# Patient Record
Sex: FEMALE | Race: WHITE | NOT HISPANIC OR LATINO | Employment: OTHER | URBAN - METROPOLITAN AREA
[De-identification: names, ages, dates, MRNs, and addresses within clinical notes are randomized per-mention and may not be internally consistent; named-entity substitution may affect disease eponyms.]

---

## 2017-01-23 ENCOUNTER — TELEPHONE (OUTPATIENT)
Dept: INTERNAL MEDICINE | Facility: CLINIC | Age: 72
End: 2017-01-23

## 2017-01-23 NOTE — TELEPHONE ENCOUNTER
----- Message from Annada Aquino sent at 1/23/2017  9:24 AM CST -----  Pt is requesting a call from nurse discuss r/s her appt to Wednesday or Friday.            Please call pt back at [phone]

## 2017-01-25 ENCOUNTER — CLINICAL SUPPORT (OUTPATIENT)
Dept: INTERNAL MEDICINE | Facility: CLINIC | Age: 72
End: 2017-01-25
Payer: COMMERCIAL

## 2017-01-25 PROCEDURE — 96372 THER/PROPH/DIAG INJ SC/IM: CPT | Mod: S$GLB,,, | Performed by: FAMILY MEDICINE

## 2017-01-25 PROCEDURE — 99999 PR PBB SHADOW E&M-EST. PATIENT-LVL I: CPT | Mod: PBBFAC,,,

## 2017-01-25 RX ADMIN — CYANOCOBALAMIN 1000 MCG: 1000 INJECTION, SOLUTION INTRAMUSCULAR; SUBCUTANEOUS at 10:01

## 2017-01-25 NOTE — PROGRESS NOTES
After obtaining consent, and per orders of Dr. Khadijah López, injection of 1000mcg of B12 given by Hari Cedeno LPN. Patient instructed to remain in clinic for 20 minutes afterwards, and to report any adverse reaction to me immediately.    Hari Cedeno LPN

## 2017-02-22 ENCOUNTER — LAB VISIT (OUTPATIENT)
Dept: LAB | Facility: HOSPITAL | Age: 72
End: 2017-02-22
Attending: FAMILY MEDICINE
Payer: COMMERCIAL

## 2017-02-22 ENCOUNTER — OFFICE VISIT (OUTPATIENT)
Dept: INTERNAL MEDICINE | Facility: CLINIC | Age: 72
End: 2017-02-22
Payer: COMMERCIAL

## 2017-02-22 VITALS
BODY MASS INDEX: 18.27 KG/M2 | HEART RATE: 60 BPM | SYSTOLIC BLOOD PRESSURE: 138 MMHG | HEIGHT: 67 IN | TEMPERATURE: 97 F | DIASTOLIC BLOOD PRESSURE: 82 MMHG | WEIGHT: 116.38 LBS

## 2017-02-22 DIAGNOSIS — F43.29 GRIEF REACTION WITH PROLONGED BEREAVEMENT: Primary | ICD-10-CM

## 2017-02-22 DIAGNOSIS — E55.9 VITAMIN D DEFICIENCY DISEASE: ICD-10-CM

## 2017-02-22 DIAGNOSIS — L40.9 PSORIASIS: ICD-10-CM

## 2017-02-22 DIAGNOSIS — E53.8 VITAMIN B12 DEFICIENCY DISEASE: ICD-10-CM

## 2017-02-22 DIAGNOSIS — F43.29 GRIEF REACTION WITH PROLONGED BEREAVEMENT: ICD-10-CM

## 2017-02-22 LAB
25(OH)D3+25(OH)D2 SERPL-MCNC: 43 NG/ML
BASOPHILS # BLD AUTO: 0.06 K/UL
BASOPHILS NFR BLD: 1 %
DIFFERENTIAL METHOD: ABNORMAL
EOSINOPHIL # BLD AUTO: 0.1 K/UL
EOSINOPHIL NFR BLD: 1 %
ERYTHROCYTE [DISTWIDTH] IN BLOOD BY AUTOMATED COUNT: 13.4 %
HCT VFR BLD AUTO: 37.3 %
HGB BLD-MCNC: 12.3 G/DL
LYMPHOCYTES # BLD AUTO: 1.3 K/UL
LYMPHOCYTES NFR BLD: 21.7 %
MCH RBC QN AUTO: 31.5 PG
MCHC RBC AUTO-ENTMCNC: 33 %
MCV RBC AUTO: 96 FL
MONOCYTES # BLD AUTO: 0.6 K/UL
MONOCYTES NFR BLD: 10.8 %
NEUTROPHILS # BLD AUTO: 3.9 K/UL
NEUTROPHILS NFR BLD: 65.5 %
PLATELET # BLD AUTO: 300 K/UL
PMV BLD AUTO: 11.2 FL
RBC # BLD AUTO: 3.9 M/UL
VIT B12 SERPL-MCNC: 621 PG/ML
WBC # BLD AUTO: 5.9 K/UL

## 2017-02-22 PROCEDURE — 1159F MED LIST DOCD IN RCRD: CPT | Mod: S$GLB,,, | Performed by: FAMILY MEDICINE

## 2017-02-22 PROCEDURE — 85025 COMPLETE CBC W/AUTO DIFF WBC: CPT

## 2017-02-22 PROCEDURE — 1126F AMNT PAIN NOTED NONE PRSNT: CPT | Mod: S$GLB,,, | Performed by: FAMILY MEDICINE

## 2017-02-22 PROCEDURE — 99999 PR PBB SHADOW E&M-EST. PATIENT-LVL III: CPT | Mod: PBBFAC,,, | Performed by: FAMILY MEDICINE

## 2017-02-22 PROCEDURE — 36415 COLL VENOUS BLD VENIPUNCTURE: CPT | Mod: PO

## 2017-02-22 PROCEDURE — 1157F ADVNC CARE PLAN IN RCRD: CPT | Mod: S$GLB,,, | Performed by: FAMILY MEDICINE

## 2017-02-22 PROCEDURE — 99214 OFFICE O/P EST MOD 30 MIN: CPT | Mod: 25,S$GLB,, | Performed by: FAMILY MEDICINE

## 2017-02-22 PROCEDURE — 96372 THER/PROPH/DIAG INJ SC/IM: CPT | Mod: S$GLB,,, | Performed by: FAMILY MEDICINE

## 2017-02-22 PROCEDURE — 82306 VITAMIN D 25 HYDROXY: CPT

## 2017-02-22 PROCEDURE — 1160F RVW MEDS BY RX/DR IN RCRD: CPT | Mod: S$GLB,,, | Performed by: FAMILY MEDICINE

## 2017-02-22 PROCEDURE — 82607 VITAMIN B-12: CPT

## 2017-02-22 RX ORDER — ACITRETIN 10 MG/1
10 CAPSULE ORAL
COMMUNITY
End: 2018-01-19

## 2017-02-22 RX ORDER — CYANOCOBALAMIN 1000 UG/ML
1000 INJECTION, SOLUTION INTRAMUSCULAR; SUBCUTANEOUS
Status: DISCONTINUED | OUTPATIENT
Start: 2017-02-22 | End: 2017-09-14

## 2017-02-22 RX ADMIN — CYANOCOBALAMIN 1000 MCG: 1000 INJECTION, SOLUTION INTRAMUSCULAR; SUBCUTANEOUS at 11:02

## 2017-02-22 NOTE — PROGRESS NOTES
Subjective:      Patient ID: Rosio Solis is a 71 y.o. female.    Chief Complaint: Follow-up (mood, weight, sleep)    HPI Comments: Patient is a 71-year-old female coming in today for follow-up of chronic conditions. .   She has been battling a prolonged grief reaction.  She did well with the Remeron however she stopped it in January.  She's now sleeping just fairly good.  She believes also that the B12 injections are helping some as well.  Her weight did increase about 2 pounds overall.  She's pleased with this at this time.  She now realizes that she must face each day forward and she is not the same person since her  has passed away but she has to move forward.  This is a new lace for the patient to be in understanding her grieving process.      She is currently taking vitamin D supplement at 2000 IUs daily.  She's willing to do lab work today.  She's doing monthly B12 injections at her office.  At this time she doesn't feel that she can get them to herself     t she is seen Dr. Marcos for her psoriasis and just recently started Soriatane.  He is monitoring her liver enzymes as well as her triglycerides.            Lab Results   Component Value Date    WBC 5.03 10/26/2016    HGB 12.4 10/26/2016    HCT 39.4 10/26/2016     10/26/2016    CHOL 258 (H) 10/26/2016    TRIG 78 10/26/2016    HDL >140 (H) 10/26/2016    ALT 12 10/26/2016    AST 18 10/26/2016     10/26/2016    K 4.7 10/26/2016    CL 99 10/26/2016    CREATININE 0.8 10/26/2016    BUN 13 10/26/2016    CO2 22 (L) 10/26/2016    TSH 1.087 10/26/2016    INR 1.0 06/19/2014       Review of Systems   Constitutional: Positive for activity change and appetite change. Negative for fatigue and unexpected weight change.   Respiratory: Negative for cough and shortness of breath.    Cardiovascular: Negative for chest pain and palpitations.   Gastrointestinal: Negative for abdominal distention and abdominal pain.   Endocrine:        Hair loss, wearing wig  today   Neurological: Negative for light-headedness and headaches.   Psychiatric/Behavioral: Negative for decreased concentration, dysphoric mood and sleep disturbance. The patient is not nervous/anxious.      Objective:     Physical Exam   Constitutional: She appears well-developed and well-nourished.   HENT:   Head: Normocephalic and atraumatic.   Right Ear: Tympanic membrane normal.   Left Ear: Tympanic membrane normal.   Mouth/Throat: Oropharynx is clear and moist.   Eyes: Conjunctivae and EOM are normal.   Neck: Normal range of motion. Neck supple.   Cardiovascular: Normal rate and regular rhythm.    Pulmonary/Chest: Effort normal and breath sounds normal.   Psychiatric: She has a normal mood and affect. Her behavior is normal. Judgment and thought content normal.   Nursing note and vitals reviewed.    Assessment:     1. Grief reaction with prolonged bereavement    2. Vitamin B12 deficiency disease    3. Vitamin D deficiency disease    4. Vitamin B12 deficiency disease      Plan:   Rosio was seen today for follow-up.    Diagnoses and all orders for this visit:    Grief reaction with prolonged bereavement  Comments:  off remeron, sleep improved even off meds. no need for counseling at this time. Ok to restart remeron in future if needed.   Orders:  -     Vitamin D; Future  -     Vitamin B12; Future  -     CBC auto differential; Future    Vitamin B12 deficiency disease  Comments:  getting monthly shot today of 1000mcg. doing well. improved with treatment. labs today.   Orders:  -     Vitamin D; Future  -     Vitamin B12; Future  -     CBC auto differential; Future  -     cyanocobalamin injection 1,000 mcg; Inject 1 mL (1,000 mcg total) into the skin every 30 days.    Vitamin D deficiency disease  Comments:  on 2000 IU daily otc. labs today  Orders:  -     Vitamin D; Future  -     Vitamin B12; Future  -     CBC auto differential; Future  Psoriasis- being treated currently by dermatology with Soriatane.  Patient's  going to have her liver enzymes and triglycerides monitored through Dr. Marcos's office.        Return in about 6 months (around 8/22/2017) for chronic issues, b12, vit D .

## 2017-02-24 ENCOUNTER — PATIENT MESSAGE (OUTPATIENT)
Dept: INTERNAL MEDICINE | Facility: CLINIC | Age: 72
End: 2017-02-24

## 2017-03-23 ENCOUNTER — CLINICAL SUPPORT (OUTPATIENT)
Dept: INTERNAL MEDICINE | Facility: CLINIC | Age: 72
End: 2017-03-23
Payer: COMMERCIAL

## 2017-03-23 PROCEDURE — 96372 THER/PROPH/DIAG INJ SC/IM: CPT | Mod: S$GLB,,, | Performed by: FAMILY MEDICINE

## 2017-03-23 RX ADMIN — CYANOCOBALAMIN 1000 MCG: 1000 INJECTION, SOLUTION INTRAMUSCULAR; SUBCUTANEOUS at 10:03

## 2017-04-10 ENCOUNTER — PATIENT MESSAGE (OUTPATIENT)
Dept: INTERNAL MEDICINE | Facility: CLINIC | Age: 72
End: 2017-04-10

## 2017-04-20 ENCOUNTER — CLINICAL SUPPORT (OUTPATIENT)
Dept: INTERNAL MEDICINE | Facility: CLINIC | Age: 72
End: 2017-04-20
Payer: COMMERCIAL

## 2017-04-20 PROCEDURE — 96372 THER/PROPH/DIAG INJ SC/IM: CPT | Mod: S$GLB,,, | Performed by: FAMILY MEDICINE

## 2017-04-20 RX ADMIN — CYANOCOBALAMIN 1000 MCG: 1000 INJECTION, SOLUTION INTRAMUSCULAR; SUBCUTANEOUS at 10:04

## 2017-05-18 ENCOUNTER — CLINICAL SUPPORT (OUTPATIENT)
Dept: INTERNAL MEDICINE | Facility: CLINIC | Age: 72
End: 2017-05-18
Payer: COMMERCIAL

## 2017-05-18 PROCEDURE — 96372 THER/PROPH/DIAG INJ SC/IM: CPT | Mod: S$GLB,,, | Performed by: FAMILY MEDICINE

## 2017-05-18 PROCEDURE — 99999 PR PBB SHADOW E&M-EST. PATIENT-LVL I: CPT | Mod: PBBFAC,,,

## 2017-05-18 RX ADMIN — CYANOCOBALAMIN 1000 MCG: 1000 INJECTION, SOLUTION INTRAMUSCULAR; SUBCUTANEOUS at 10:05

## 2017-06-07 ENCOUNTER — PATIENT MESSAGE (OUTPATIENT)
Dept: INTERNAL MEDICINE | Facility: CLINIC | Age: 72
End: 2017-06-07

## 2017-06-12 ENCOUNTER — PATIENT MESSAGE (OUTPATIENT)
Dept: INTERNAL MEDICINE | Facility: CLINIC | Age: 72
End: 2017-06-12

## 2017-06-15 ENCOUNTER — OFFICE VISIT (OUTPATIENT)
Dept: INTERNAL MEDICINE | Facility: CLINIC | Age: 72
End: 2017-06-15
Payer: COMMERCIAL

## 2017-06-15 VITALS
BODY MASS INDEX: 18.24 KG/M2 | SYSTOLIC BLOOD PRESSURE: 140 MMHG | DIASTOLIC BLOOD PRESSURE: 70 MMHG | HEIGHT: 67 IN | HEART RATE: 78 BPM | WEIGHT: 116.19 LBS | TEMPERATURE: 97 F

## 2017-06-15 DIAGNOSIS — E53.8 VITAMIN B12 DEFICIENCY DISEASE: ICD-10-CM

## 2017-06-15 DIAGNOSIS — H69.90 EUSTACHIAN TUBE DYSFUNCTION, UNSPECIFIED LATERALITY: ICD-10-CM

## 2017-06-15 DIAGNOSIS — F43.29 GRIEF REACTION WITH PROLONGED BEREAVEMENT: Primary | ICD-10-CM

## 2017-06-15 DIAGNOSIS — J30.89 ALLERGIC RHINITIS DUE TO OTHER ALLERGIC TRIGGER, UNSPECIFIED RHINITIS SEASONALITY: ICD-10-CM

## 2017-06-15 PROCEDURE — 1159F MED LIST DOCD IN RCRD: CPT | Mod: S$GLB,,, | Performed by: FAMILY MEDICINE

## 2017-06-15 PROCEDURE — 99214 OFFICE O/P EST MOD 30 MIN: CPT | Mod: 25,S$GLB,, | Performed by: FAMILY MEDICINE

## 2017-06-15 PROCEDURE — 96372 THER/PROPH/DIAG INJ SC/IM: CPT | Mod: S$GLB,,, | Performed by: FAMILY MEDICINE

## 2017-06-15 PROCEDURE — 1126F AMNT PAIN NOTED NONE PRSNT: CPT | Mod: S$GLB,,, | Performed by: FAMILY MEDICINE

## 2017-06-15 PROCEDURE — 99999 PR PBB SHADOW E&M-EST. PATIENT-LVL III: CPT | Mod: PBBFAC,,, | Performed by: FAMILY MEDICINE

## 2017-06-15 RX ORDER — FLUTICASONE PROPIONATE 50 MCG
2 SPRAY, SUSPENSION (ML) NASAL DAILY
Qty: 16 G | Refills: 0 | Status: SHIPPED | OUTPATIENT
Start: 2017-06-15 | End: 2017-07-15

## 2017-06-15 RX ADMIN — CYANOCOBALAMIN 1000 MCG: 1000 INJECTION, SOLUTION INTRAMUSCULAR; SUBCUTANEOUS at 01:06

## 2017-06-15 NOTE — PROGRESS NOTES
"Subjective:      Patient ID: Rosio Solis is a 71 y.o. female.    Chief Complaint: ear issues; sinus issues; and grief    Patient is a 71-year-old female coming in today for follow-up of chronic conditions. .   She has been battling a prolonged grief reaction.  She did well with the Remeron however she stopped it in January.  [facility] however she stopped because she felt like she just needs someone that was a little bit older.  She got some good benefit out of it but she still feels like she needs to go back into counseling.  She is currently sleeping well.  She's not on any medications at this time for the grief reaction however once again she is back to where she feels like she did almost cry again.  She feels that she's having a "non-emotional status" and this is bothering her.    She also reports that lately since the end of April she felt like she was having a lot of sinus issues.  She met with her pharmacist and discussed with some he had her to use some Sudafed and she started nasal spray.  She did it for about 2-3 weeks but still ever since she feels like occasionally her ears will get stopped up at times and she'll have a postnasal drip.  She is not currently on any Sudafed at this time.  She's not had any hearing loss.  She normally doesn't have any issues with ALLERGIES.    She's currently seeing Dr. Bertrand for her psoriasis and he has her on a Retin-A type product.  She'sat she's getting a lot of red spots recurrently.  She is uncertain if this was a common side effect from it.    She's also here today to have another B12 injection.  She was noted to be low on her B12.        Lab Results   Component Value Date    WBC 5.90 02/22/2017    HGB 12.3 02/22/2017    HCT 37.3 02/22/2017     02/22/2017    CHOL 258 (H) 10/26/2016    TRIG 78 10/26/2016    HDL >140 (H) 10/26/2016    ALT 12 10/26/2016    AST 18 10/26/2016     10/26/2016    K 4.7 " 10/26/2016    CL 99 10/26/2016    CREATININE 0.8 10/26/2016    BUN 13 10/26/2016    CO2 22 (L) 10/26/2016    TSH 1.087 10/26/2016    INR 1.0 06/19/2014       Review of Systems   Constitutional: Negative for activity change, appetite change, fatigue and unexpected weight change.   HENT: Positive for congestion and postnasal drip. Negative for ear discharge, ear pain, rhinorrhea, sinus pressure, sneezing, sore throat, tinnitus, trouble swallowing and voice change.    Respiratory: Negative for cough and shortness of breath.    Cardiovascular: Negative for chest pain and palpitations.   Hematological: Negative for adenopathy. Bruises/bleeds easily.   Psychiatric/Behavioral: Positive for dysphoric mood. Negative for decreased concentration and sleep disturbance. The patient is not nervous/anxious.      Objective:     Physical Exam   Constitutional: She appears well-developed and well-nourished.   HENT:   Head: Normocephalic and atraumatic.   Right Ear: Tympanic membrane and external ear normal. No decreased hearing is noted.   Left Ear: Tympanic membrane and external ear normal. No decreased hearing is noted.   Nose: Mucosal edema and rhinorrhea present.   Mouth/Throat: Uvula is midline and oropharynx is clear and moist.   Eyes: Conjunctivae and EOM are normal.   Neck: Normal range of motion. Neck supple.   Cardiovascular: Normal rate and regular rhythm.    Pulmonary/Chest: Effort normal and breath sounds normal.   Psychiatric: Her speech is normal and behavior is normal. Thought content normal. She exhibits a depressed mood.   Nursing note and vitals reviewed.    Assessment:     1. Grief reaction with prolonged bereavement    2. Eustachian tube dysfunction, unspecified laterality    3. Allergic rhinitis due to other allergic trigger, unspecified rhinitis seasonality    4. Vitamin B12 deficiency disease      Plan:   Rosio was seen today for ear issues, sinus issues and grief.    Diagnoses and all orders for this  visit:    Grief reaction with prolonged bereavement- refer to yane scott vs Dr oGdfrey Ag for additional counseling.   -     Ambulatory referral to Psychology  -     CBC auto differential; Future  -     Vitamin B12; Future  -     Comprehensive metabolic panel; Future    Eustachian tube dysfunction, unspecified laterality- new. Trial of nasal steroids. Add sudafed. If not improving, can do trial of singulair.     Allergic rhinitis due to other allergic trigger, unspecified rhinitis seasonality  -     CBC auto differential; Future  -     Vitamin B12; Future  -     Comprehensive metabolic panel; Future    Vitamin B12 deficiency disease - monthly injections, labs prior to next visit  -     CBC auto differential; Future  -     Vitamin B12; Future  -     Comprehensive metabolic panel; Future    Other orders  -     fluticasone (FLONASE) 50 mcg/actuation nasal spray; 2 sprays by Each Nare route once daily. Would recommend sensimyst or version like veramyst            Return in about 3 months (around 9/15/2017) for b12, counselors.

## 2017-06-20 ENCOUNTER — PATIENT MESSAGE (OUTPATIENT)
Dept: INTERNAL MEDICINE | Facility: CLINIC | Age: 72
End: 2017-06-20

## 2017-06-29 ENCOUNTER — OFFICE VISIT (OUTPATIENT)
Dept: PSYCHIATRY | Facility: CLINIC | Age: 72
End: 2017-06-29
Payer: COMMERCIAL

## 2017-06-29 DIAGNOSIS — F43.29 GRIEF REACTION WITH PROLONGED BEREAVEMENT: ICD-10-CM

## 2017-06-29 DIAGNOSIS — F43.21 ADJUSTMENT REACTION WITH PROLONGED DEPRESSIVE REACTION: Primary | ICD-10-CM

## 2017-06-29 PROCEDURE — 90791 PSYCH DIAGNOSTIC EVALUATION: CPT | Mod: S$GLB,,, | Performed by: SOCIAL WORKER

## 2017-07-07 NOTE — PROGRESS NOTES
"Psychiatry Initial Visit (PhD/LCSW)  Diagnostic Interview - CPT 02274    Date: 2017    Site: [facility]    Referral source:  Primary care physician Khadijah López M.D. Of Family Medicine    Clinical status of patient: Outpatient    Rosio Solis, a 71 y.o. female, for initial evaluation visit.  Met with patient.    Chief complaint/reason for encounter: depression and appetite    History of present illness:  71 year old  female patient presented as referral from Family Medicine for report of depressive symptoms, with prolonged grief period.  Patient presented alert and oriented, neatly groomed, good eye contact and clear, linear verbal thought expression.  She reported her , Naun, with an "N",  two years earlier of cancer.  Patient has since tried to move on emotionally but said she finds herself returning to depressive symptoms, which include loss of appetite, ruminating thoughts, loss of valarie, and loss of desire to socialize.  She stated her 6 granchildren help keep her involved and that she does make an effort but feels she has to force herself to do things.  She reported no suicidal ideation, no notable sleep disturbance, and no mood altering substance use problems.  She is well developed with no notable health concerns, though she said she often feels emotionally low-energy.  Her only reported medical issue, some psoriasis.  She had lost about 13 pounds in the final months of her 's illness, as she was focused on him and lost her appetite, and she said she has not gained back much in the past 2 years.  She appears thin but no gaunt. At her most recent physical exam, about a month ago, she weighed in at 116 lbs at 5'7".   Patient reported her only prior mental health history was of brief psychotherapy with a Dr. Burrows, PhD, about 4 sessions, following her 's death, but she discontinued, finding it a little  Hard to relate to him, due to the age difference.  She reported " "she did derive some benefit.  In talking with Dr. López, she decided to try counseling again, feeling a need to process her feelings and loss further.  Patient talked extensively of her marriage of 34 years, it being the second marriage for each of them.  She said she felt a connection with her  that she doubts many other couples experience, and she continues to feel a tremendous void from his passing.  She said she does maintain regular contact with her 2 children and cares about being involved with the 6 grandchildren, 3 from each of her children.  Identified goals of therapy are for patient to further process her grief, relieve some stress and enhance perspective; to get back in touch with feelings of gratitude for what she does have in her life.  Plan is to follow up in clinic 7/13/17.    Pain: noncontributory    Symptoms:   · Mood: depressed mood, diminished interest, weight loss and social isolation  · Anxiety: denied  · Substance abuse: denied  · Cognitive functioning: denied  · Health behaviors: noncontributory    Psychiatric history: psychotropic management by PCP and has participated in counseling/psychotherapy on an outpatient basis in the past    Medical history: noncontributory    Family history of psychiatric illness: none    Social history (marriage, employment, etc.):  Born in [place], the oldest of 2, with a brother 4 years younger.  Raised by both biological parents.  Brother remains in the [place] area.   twice.  first husbnad after 12 years; "just grew apart." 2 children by her first marriage, a boy and a girl.  Both are well into adulthood, with teens or pre-teens of their own.  Grandmother of 6, from her daughter ages 18, 16, and 15, and by her son ages 13, 9, and 7.   to second  for 34 years; they met at a teaching convention.  Both career educators.  Patient relocated to [place], [place] with .  Patient is retired; has regular frequent contact with " "children and grandchildren.  Stays involved, "even when [she] doesn't feel like it."     Substance use:   Alcohol: social   Drugs: none   Tobacco: none   Caffeine: clinically insignificant    Current medications and drug reactions (include OTC, herbal): see medication list      Strengths and liabilities: Strength: Patient accepts guidance/feedback, Strength: Patient is expressive/articulate., Strength: Patient is intelligent., Strength: Patient is physically healthy., Strength: Patient has positive support network.    Current Evaluation:     Mental Status Exam:  General Appearance:  unremarkable, age appropriate, normal weight, neatly groomed   Speech: normal tone, normal rate, normal pitch, normal volume      Level of Cooperation: cooperative      Thought Processes: normal and logical   Mood: sad      Thought Content: normal, no suicidality, no homicidality, delusions, or paranoia   Affect: congruent and appropriate   Orientation: Oriented x3   Memory: recent and remote memory intact   Attention Span & Concentration: intact   Fund of General Knowledge: intact and appropriate to age and level of education   Abstract Reasoning: not formally assessed   Judgment & Insight: fair     Language  intact     Diagnostic Impression - Plan:       ICD-10-CM ICD-9-CM   1. Adjustment reaction with prolonged depressive reaction F43.21 309.1   2. Grief reaction with prolonged bereavement F43.21 309.0       Plan:individual psychotherapy and medication management by physician    Return to Clinic: as scheduled    Length of Service (minutes): 45  "

## 2017-07-13 ENCOUNTER — OFFICE VISIT (OUTPATIENT)
Dept: PSYCHIATRY | Facility: CLINIC | Age: 72
End: 2017-07-13
Payer: COMMERCIAL

## 2017-07-13 ENCOUNTER — CLINICAL SUPPORT (OUTPATIENT)
Dept: INTERNAL MEDICINE | Facility: CLINIC | Age: 72
End: 2017-07-13
Payer: COMMERCIAL

## 2017-07-13 DIAGNOSIS — F43.29 GRIEF REACTION WITH PROLONGED BEREAVEMENT: Primary | ICD-10-CM

## 2017-07-13 PROCEDURE — 90834 PSYTX W PT 45 MINUTES: CPT | Mod: S$GLB,,, | Performed by: SOCIAL WORKER

## 2017-07-17 NOTE — PROGRESS NOTES
"Individual Psychotherapy (PhD/LCSW)    7/13/2017    Site:  [facility]         Therapeutic Intervention: Met with patient.  Outpatient - Insight oriented psychotherapy 45 min - CPT code 67119 and Outpatient - Supportive psychotherapy 45 min - CPT Code 73097    Chief complaint/reason for encounter: depression and grief     Interval history and content of current session:  71 year old female patient returned for follow up psychotherapy to address unresolved grief/prolonged bereavement at loss of her , Naun, 2 years ago from cancer.  Patient presented alert and oriented, neatly groomed, smiling pleasantly.  She brought a nice framed photo of her late  circa 2010, before his cancer diagnosis.  Patient reported doing fair in the interim.  She reported her assessment from previously that she feels "un-anchored," unsure of what she even wants or can look forward to.  She endorsed a thought pattern for much of her life of "not belonging," of not being good enough.  She recounted that meeting her [id number] was when she managed to escape that feeling.  She felt very well-matched to him.  Now she is back to feeling at a loss.  But she also endorsed that she has friends and family that care for her, that she remains involved socially, even when she does not feel inclined to be social, and that she has good friends she does talk to.  Patient talked for a while of some of her 's qualities she appreciated most.  Supportive therapy provided.  Patient endorsed feeling lack of purpose but denied any si/hi, psychosis, mood swings, or substance abuse.  Plan is to follow up in clinic 8/8/17.     Treatment plan:  · Target symptoms: depression, adjustment, grief  · Why chosen therapy is appropriate versus another modality: relevant to diagnosis, patient responds to this modality  · Outcome monitoring methods: self-report, observation  · Therapeutic intervention type: insight oriented psychotherapy, supportive " psychotherapy    Risk parameters:  Patient reports no suicidal ideation  Patient reports no homicidal ideation  Patient reports no self-injurious behavior  Patient reports no violent behavior    Verbal deficits: None    Patient's response to intervention:  The patient's response to intervention is accepting.    Progress toward goals and other mental status changes:  The patient's progress toward goals is fair .    Diagnosis:     ICD-10-CM ICD-9-CM   1. Grief reaction with prolonged bereavement F43.21 309.0       Plan:  individual psychotherapy    Return to clinic: as scheduled    Length of Service (minutes): 45

## 2017-08-09 ENCOUNTER — OFFICE VISIT (OUTPATIENT)
Dept: PSYCHIATRY | Facility: CLINIC | Age: 72
End: 2017-08-09
Payer: COMMERCIAL

## 2017-08-09 DIAGNOSIS — F43.29 GRIEF REACTION WITH PROLONGED BEREAVEMENT: Primary | ICD-10-CM

## 2017-08-09 PROCEDURE — 90834 PSYTX W PT 45 MINUTES: CPT | Mod: S$GLB,,, | Performed by: SOCIAL WORKER

## 2017-08-10 ENCOUNTER — CLINICAL SUPPORT (OUTPATIENT)
Dept: INTERNAL MEDICINE | Facility: CLINIC | Age: 72
End: 2017-08-10
Payer: COMMERCIAL

## 2017-08-10 PROCEDURE — 96372 THER/PROPH/DIAG INJ SC/IM: CPT | Mod: S$GLB,,, | Performed by: FAMILY MEDICINE

## 2017-08-10 PROCEDURE — 99999 PR PBB SHADOW E&M-EST. PATIENT-LVL I: CPT | Mod: PBBFAC,,,

## 2017-08-10 RX ADMIN — CYANOCOBALAMIN 1000 MCG: 1000 INJECTION, SOLUTION INTRAMUSCULAR; SUBCUTANEOUS at 09:08

## 2017-08-11 NOTE — PROGRESS NOTES
Individual Psychotherapy (PhD/LCSW)    8/9/2017    Site:  [facility]         Therapeutic Intervention: Met with patient.  Outpatient - Insight oriented psychotherapy 45 min - CPT code 99681 and Outpatient - Supportive psychotherapy 45 min - CPT Code 22030    Chief complaint/reason for encounter: depression and grief     Interval history and content of current session:  71 year old female patient returned for follow up psychotherapy to address unresolved grief/prolonged bereavement at loss of her , Naun, 2 years ago from cancer.  Patient presented alert and oriented, neatly groomed, pleasant.  Patient reported she remains preoccupied much of the time with thoughts of missing her late  these past 2 years.  She reported continuing to go through the motions of socializing, stays involved with step-grandchildren and great-nieces/nephews; plays cards with old friends, etc.  She reported she also stays aware of a social calendar and has trips planned.  Endorsed often feeling like she does not want to do any of it but she makes herself.  Noted October 9th is her 3 year anniversary of her 's death.  She mentioned a trip planned to [place], [place] to see old friends [place] Wednesday for 7 days; stated mixed feelings; lacking drive but said she will make herself go; hates to disappoint people expecting her.  Reported difficulty getting out of bed some times, not physically but emotionally.  Talked about recurrent migraine; said she sometimes goes months between headaches but this past week she has migraines on two back to back days.  Mentioned she still has concert tickets to see Herbert Walsh in October; said she is actually looking forward to that; has a friend she plans to ask to accompany her.  Denied any si/hi, psychosis, cognitive deficit, moo swings or substance abuse.  Expressed her spiritual foundational beliefs as concrete barrier to her ever considering such a thing as suicide; stated it does  not even cross her mind.  Supportive therapy provided.  Plan is to follow up in clinic sometime after her [place] trip; said she needs to check her calendar but anticipated for early to mid-September.    Treatment plan:  · Target symptoms: depression, adjustment, grief  · Why chosen therapy is appropriate versus another modality: relevant to diagnosis, patient responds to this modality  · Outcome monitoring methods: self-report, observation  · Therapeutic intervention type: insight oriented psychotherapy, supportive psychotherapy    Risk parameters:  Patient reports no suicidal ideation  Patient reports no homicidal ideation  Patient reports no self-injurious behavior  Patient reports no violent behavior    Verbal deficits: None    Patient's response to intervention:  The patient's response to intervention is accepting.    Progress toward goals and other mental status changes:  The patient's progress toward goals is fair .    Diagnosis:     ICD-10-CM ICD-9-CM   1. Grief reaction with prolonged bereavement F43.21 309.0       Plan:  individual psychotherapy    Return to clinic: as scheduled    Length of Service (minutes): 45

## 2017-09-12 ENCOUNTER — LAB VISIT (OUTPATIENT)
Dept: LAB | Facility: HOSPITAL | Age: 72
End: 2017-09-12
Attending: FAMILY MEDICINE
Payer: COMMERCIAL

## 2017-09-12 DIAGNOSIS — J30.9 ALLERGIC RHINITIS DUE TO ALLERGEN: ICD-10-CM

## 2017-09-12 DIAGNOSIS — E53.8 VITAMIN B12 DEFICIENCY DISEASE: ICD-10-CM

## 2017-09-12 DIAGNOSIS — F43.29 GRIEF REACTION WITH PROLONGED BEREAVEMENT: ICD-10-CM

## 2017-09-12 LAB
ALBUMIN SERPL BCP-MCNC: 3.9 G/DL
ALP SERPL-CCNC: 79 U/L
ALT SERPL W/O P-5'-P-CCNC: 14 U/L
ANION GAP SERPL CALC-SCNC: 10 MMOL/L
AST SERPL-CCNC: 21 U/L
BASOPHILS # BLD AUTO: 0.03 K/UL
BASOPHILS NFR BLD: 0.6 %
BILIRUB SERPL-MCNC: 0.4 MG/DL
BUN SERPL-MCNC: 13 MG/DL
CALCIUM SERPL-MCNC: 9.4 MG/DL
CHLORIDE SERPL-SCNC: 100 MMOL/L
CO2 SERPL-SCNC: 29 MMOL/L
CREAT SERPL-MCNC: 0.7 MG/DL
DIFFERENTIAL METHOD: ABNORMAL
EOSINOPHIL # BLD AUTO: 0.1 K/UL
EOSINOPHIL NFR BLD: 1.2 %
ERYTHROCYTE [DISTWIDTH] IN BLOOD BY AUTOMATED COUNT: 14.1 %
EST. GFR  (AFRICAN AMERICAN): >60 ML/MIN/1.73 M^2
EST. GFR  (NON AFRICAN AMERICAN): >60 ML/MIN/1.73 M^2
GLUCOSE SERPL-MCNC: 80 MG/DL
HCT VFR BLD AUTO: 36.3 %
HGB BLD-MCNC: 11.8 G/DL
LYMPHOCYTES # BLD AUTO: 1.2 K/UL
LYMPHOCYTES NFR BLD: 23.5 %
MCH RBC QN AUTO: 30.4 PG
MCHC RBC AUTO-ENTMCNC: 32.5 G/DL
MCV RBC AUTO: 94 FL
MONOCYTES # BLD AUTO: 0.4 K/UL
MONOCYTES NFR BLD: 8.3 %
NEUTROPHILS # BLD AUTO: 3.4 K/UL
NEUTROPHILS NFR BLD: 66.2 %
PLATELET # BLD AUTO: 300 K/UL
PMV BLD AUTO: 11.5 FL
POTASSIUM SERPL-SCNC: 4.4 MMOL/L
PROT SERPL-MCNC: 7.5 G/DL
RBC # BLD AUTO: 3.88 M/UL
SODIUM SERPL-SCNC: 139 MMOL/L
WBC # BLD AUTO: 5.15 K/UL

## 2017-09-12 PROCEDURE — 85025 COMPLETE CBC W/AUTO DIFF WBC: CPT

## 2017-09-12 PROCEDURE — 82607 VITAMIN B-12: CPT

## 2017-09-12 PROCEDURE — 80053 COMPREHEN METABOLIC PANEL: CPT

## 2017-09-12 PROCEDURE — 36415 COLL VENOUS BLD VENIPUNCTURE: CPT | Mod: PO

## 2017-09-13 LAB — VIT B12 SERPL-MCNC: 679 PG/ML

## 2017-09-14 ENCOUNTER — OFFICE VISIT (OUTPATIENT)
Dept: INTERNAL MEDICINE | Facility: CLINIC | Age: 72
End: 2017-09-14
Payer: COMMERCIAL

## 2017-09-14 ENCOUNTER — DOCUMENTATION ONLY (OUTPATIENT)
Dept: INTERNAL MEDICINE | Facility: CLINIC | Age: 72
End: 2017-09-14

## 2017-09-14 VITALS
DIASTOLIC BLOOD PRESSURE: 70 MMHG | BODY MASS INDEX: 18.03 KG/M2 | SYSTOLIC BLOOD PRESSURE: 132 MMHG | HEIGHT: 67 IN | HEART RATE: 80 BPM | TEMPERATURE: 98 F | WEIGHT: 114.88 LBS

## 2017-09-14 DIAGNOSIS — D51.9 ANEMIA DUE TO VITAMIN B12 DEFICIENCY, UNSPECIFIED B12 DEFICIENCY TYPE: ICD-10-CM

## 2017-09-14 DIAGNOSIS — E53.8 VITAMIN B12 DEFICIENCY DISEASE: Primary | ICD-10-CM

## 2017-09-14 DIAGNOSIS — F43.29 GRIEF REACTION WITH PROLONGED BEREAVEMENT: ICD-10-CM

## 2017-09-14 DIAGNOSIS — H61.21 IMPACTED CERUMEN OF RIGHT EAR: ICD-10-CM

## 2017-09-14 DIAGNOSIS — Z12.11 COLON CANCER SCREENING: ICD-10-CM

## 2017-09-14 DIAGNOSIS — J30.89 CHRONIC NONSEASONAL ALLERGIC RHINITIS DUE TO OTHER ALLERGEN: ICD-10-CM

## 2017-09-14 DIAGNOSIS — L40.9 PSORIASIS: ICD-10-CM

## 2017-09-14 PROCEDURE — 99214 OFFICE O/P EST MOD 30 MIN: CPT | Mod: 25,S$GLB,, | Performed by: FAMILY MEDICINE

## 2017-09-14 PROCEDURE — 3008F BODY MASS INDEX DOCD: CPT | Mod: S$GLB,,, | Performed by: FAMILY MEDICINE

## 2017-09-14 PROCEDURE — 1159F MED LIST DOCD IN RCRD: CPT | Mod: S$GLB,,, | Performed by: FAMILY MEDICINE

## 2017-09-14 PROCEDURE — 99999 PR PBB SHADOW E&M-EST. PATIENT-LVL III: CPT | Mod: PBBFAC,,, | Performed by: FAMILY MEDICINE

## 2017-09-14 PROCEDURE — 99397 PER PM REEVAL EST PAT 65+ YR: CPT | Mod: S$GLB,,, | Performed by: FAMILY MEDICINE

## 2017-09-14 PROCEDURE — 1126F AMNT PAIN NOTED NONE PRSNT: CPT | Mod: S$GLB,,, | Performed by: FAMILY MEDICINE

## 2017-09-14 RX ORDER — CYANOCOBALAMIN 1000 UG/ML
1000 INJECTION, SOLUTION INTRAMUSCULAR; SUBCUTANEOUS
Status: DISCONTINUED | OUTPATIENT
Start: 2017-09-26 | End: 2017-10-06

## 2017-09-14 RX ORDER — FLUTICASONE PROPIONATE 50 MCG
2 SPRAY, SUSPENSION (ML) NASAL DAILY
Qty: 16 G | Refills: 12
Start: 2017-09-14 | End: 2017-10-14

## 2017-09-14 NOTE — PROGRESS NOTES
Subjective:      Patient ID: Rosio Solis is a 72 y.o. female.    Chief Complaint: Follow-up (labs, meds, anemia, mood)    Patient is a 72-year-old female coming in today for follow-up of chronic conditions. .   She has been battling a prolonged grief reaction.  She did well with the Remeron however she stopped it in January.  She attended counseling at the [facility] with Dr. Gary Hale however she stopped because she felt like she just needs someone that was a little bit older.  She did see counselor Jonah Stone 3 times.  She really doesn't feel like she needs it again.  She has it if she needs it.  She is currently sleeping well.  She's not on any medications at this time for the grief reaction.  In October and will be her 3 year anniversary of her 's passing.    She also reports that lately since the end of April she felt like she was having a lot of sinus issues.  She met with her pharmacist and discussed with some he had her to use some Sudafed and she started nasal spray.  She did it for about 2-3 weeks but still ever since she feels like occasionally her ears will get stopped up at times and she'll have a postnasal drip.  She is not currently on any Sudafed at this time.  She's not had any hearing loss.  She normally doesn't have any issues with ALLERGIES.  Does have some irritation of the nasal mucosa today.  Feels like her ears are recurrently stopped up.  Right ear is noted to have some ear wax present but she doesn't want to do anything about it today because she's traveling out of town [facility].    She's currently seeing Dr. Marcos for her psoriasis and he has her on a Retin-A type product.  She'sat she's getting a lot of red spots recurrently.  She is uncertain if this was a common side effect from it.  She is also noted to be anemic for the first time.  She has a known history of B12 deficiency.  She's been doing well with a monthly injection.  She is seeing some regrowth of her  hair.    From a vitamin D standpoint she is currently on 2000 IUs daily.  Her levels appear to be good.            Lab Results   Component Value Date    WBC 5.15 09/12/2017    HGB 11.8 (L) 09/12/2017    HCT 36.3 (L) 09/12/2017     09/12/2017    CHOL 258 (H) 10/26/2016    TRIG 78 10/26/2016    HDL >140 (H) 10/26/2016    ALT 14 09/12/2017    AST 21 09/12/2017     09/12/2017    K 4.4 09/12/2017     09/12/2017    CREATININE 0.7 09/12/2017    BUN 13 09/12/2017    CO2 29 09/12/2017    TSH 1.087 10/26/2016    INR 1.0 06/19/2014       Review of Systems   Constitutional: Negative for activity change, appetite change, fatigue and unexpected weight change.   HENT: Positive for congestion, ear discharge, postnasal drip and sinus pressure. Negative for ear pain, hearing loss, rhinorrhea and trouble swallowing.    Eyes: Negative for discharge and visual disturbance.   Respiratory: Negative for chest tightness and wheezing.    Cardiovascular: Negative for chest pain and palpitations.   Gastrointestinal: Negative for blood in stool, constipation, diarrhea and vomiting.   Endocrine: Negative for polydipsia and polyuria.   Genitourinary: Negative for difficulty urinating, dysuria, hematuria and menstrual problem.   Musculoskeletal: Negative for arthralgias, joint swelling and neck pain.   Skin: Positive for color change.   Neurological: Negative for weakness, light-headedness and headaches.   Hematological: Bruises/bleeds easily.   Psychiatric/Behavioral: Negative for confusion, dysphoric mood and sleep disturbance. The patient is not nervous/anxious.      Objective:     Physical Exam   Constitutional: She appears well-developed and well-nourished.   HENT:   Head: Normocephalic and atraumatic.   Right Ear: Tympanic membrane and external ear normal. No decreased hearing is noted.   Left Ear: Tympanic membrane and external ear normal. No decreased hearing is noted.   Nose: Mucosal edema and rhinorrhea present.    Mouth/Throat: Uvula is midline and oropharynx is clear and moist.   Right ear with some cerumen present but not fully impacted   Eyes: Conjunctivae and EOM are normal.   Neck: Normal range of motion. Neck supple.   Cardiovascular: Normal rate and regular rhythm.    Pulmonary/Chest: Effort normal and breath sounds normal.   Skin: Bruising, ecchymosis and lesion noted. No rash noted. There is erythema.        Psychiatric: She has a normal mood and affect. Her speech is normal and behavior is normal. Judgment and thought content normal. Cognition and memory are normal.   Nursing note reviewed.    Assessment:     1. Vitamin B12 deficiency disease    2. Anemia due to vitamin B12 deficiency, unspecified B12 deficiency type    3. Psoriasis    4. Grief reaction with prolonged bereavement    5. Colon cancer screening    6. Chronic nonseasonal allergic rhinitis due to other allergen    7. Impacted cerumen of right ear      Plan:   Rosio was seen today for follow-up of multiple issues    Diagnoses and all orders for this visit:    Vitamin B12 deficiency disease-continue with monthly dosing improving  -     CBC auto differential; Future  -     Vitamin B12; Future  -     Ferritin; Future  -     Iron and TIBC; Future  -     Comprehensive metabolic panel; Future  -     cyanocobalamin injection 1,000 mcg; Inject 1 mL (1,000 mcg total) into the skin every 30 days.    Anemia due to vitamin B12 deficiency, unspecified B12 deficiency type-new for the patient will advise her to my touch base with her dermatologist regarding this.  Would also recommend that she do the fecal occult blood testing today to rule out any blood loss from the gut.  She declines doing a colonoscopy.  -     Fecal Immunochemical Test (iFOBT); Future  -     CBC auto differential; Future  -     Vitamin B12; Future  -     Ferritin; Future  -     Iron and TIBC; Future  -     Comprehensive metabolic panel; Future    Psoriasis-seeing Dr. Bertrand currently on Retin-A  products intermittently.  -     CBC auto differential; Future  -     Vitamin B12; Future  -     Ferritin; Future  -     Iron and TIBC; Future  -     Comprehensive metabolic panel; Future    Grief reaction with prolonged bereavement-improving and stable, status post counseling not currently on medications  -     CBC auto differential; Future  -     Vitamin B12; Future  -     Ferritin; Future  -     Iron and TIBC; Future  -     Comprehensive metabolic panel; Future    Colon cancer screening  -     Fecal Immunochemical Test (iFOBT); Future    Chronic nonseasonal allergic rhinitis due to other allergen-start Flonase start nasal saline, follow-up if not improving  -     CBC auto differential; Future  -     Vitamin B12; Future  -     Ferritin; Future  -     Iron and TIBC; Future  -     Comprehensive metabolic panel; Future    Impacted cerumen of right ear-not fully occluded but would recommend debrox    Other orders  -     fluticasone (FLONASE) 50 mcg/actuation nasal spray; 2 sprays by Each Nare route once daily.            Return in about 6 months (around 3/14/2018), or chronic issues.

## 2017-09-14 NOTE — PROGRESS NOTES
"Rosio Solis presented for a follow-up Medicare AWV or  prevention exam today. The following components were reviewed and updated:    · Medical history  · Family History  · Social history  · Allergies and Current Medications  · Health Maintenance  · Patient Care Team:  · Khadijah López MD as PCP - General (Family Medicine)  · Dr Diann Marcos- derm    **See Completed Assessments for Annual Wellness visit with in the encounter summary    · Medicare wellness assessment:  ·   Depression screening  · 1. In the past 2 weeks she has the patient felt down, depressed or hopeless? No  · 2. Over the past 2 weeks as the patient felt little interest or pleasure in doing things?  No  ·  Functional Ability/ Safety Screening  · 1. Was the  Patient's timed up and go test unsteady or longer than 30 seconds?  No   · 2. Has the patient needed help with transportation shopping preparing meals housework laundry medications are managing money?  No  · 3.  If the patient's home have rugs in the hallway, back grab bars in the bathroom, or poor lighting? No, not necessary per patient.   · 4.has there been any hearing difficulties?  No  · Advance Directives:  · 1. Patient does have a living will in place.     Vitals:    09/14/17 1315   BP: 132/70   Pulse: 80   Temp: 97.6 °F (36.4 °C)   TempSrc: Tympanic   Weight: 52.1 kg (114 lb 13.8 oz)   Height: 5' 7" (1.702 m)     Body mass index is 17.99 kg/m².   ]      Medicare Annual Wellness Visit, Subsequent   Patient Active Problem List   Diagnosis    Rash and nonspecific skin eruption    Dry eyes    Grief reaction with prolonged bereavement    Vitamin D deficiency disease    Vitamin B12 deficiency disease    Psoriasis         Provided Rosio with a 5-10 year written screening schedule and personal prevention plan. Recommendations were developed using the USPSTF age appropriate recommendations. Education, counseling, and referrals were provided as needed.  After Visit Summary printed and given to " patient which includes a list of additional screenings\tests needed.    Health Maintenance   Topic Date Due    Lipid Panel  10/26/2017    DEXA SCAN  11/03/2018    TETANUS VACCINE  11/03/2025    Colonoscopy  11/03/2025    Hepatitis C Screening  Completed    Zoster Vaccine  Excluded    Pneumococcal (65+)  Excluded    Influenza Vaccine  Excluded    Mammogram  Excluded     The patient does not want to proceed forward with any mammograms.  She does not want to do any pneumonia or shingles vaccines at this time.  She does not want to do flu shots. She is willing to do a yearly fecal DNA testing.  We've given her a kit when available.   She's up-to-date on her bone density.  She is up-to-date on her lab work.  Return in about 6 months (around 3/14/2018), or chronic issues.      Khadijah López MD

## 2017-09-18 ENCOUNTER — PATIENT MESSAGE (OUTPATIENT)
Dept: INTERNAL MEDICINE | Facility: CLINIC | Age: 72
End: 2017-09-18

## 2017-10-02 ENCOUNTER — PATIENT OUTREACH (OUTPATIENT)
Dept: ADMINISTRATIVE | Facility: HOSPITAL | Age: 72
End: 2017-10-02

## 2017-10-03 ENCOUNTER — LAB VISIT (OUTPATIENT)
Dept: LAB | Facility: HOSPITAL | Age: 72
End: 2017-10-03
Attending: FAMILY MEDICINE
Payer: COMMERCIAL

## 2017-10-03 DIAGNOSIS — Z12.11 COLON CANCER SCREENING: ICD-10-CM

## 2017-10-03 DIAGNOSIS — D51.9 ANEMIA DUE TO VITAMIN B12 DEFICIENCY, UNSPECIFIED B12 DEFICIENCY TYPE: ICD-10-CM

## 2017-10-03 PROCEDURE — 82274 ASSAY TEST FOR BLOOD FECAL: CPT

## 2017-10-04 LAB — HEMOCCULT STL QL IA: NEGATIVE

## 2017-10-05 ENCOUNTER — TELEPHONE (OUTPATIENT)
Dept: INTERNAL MEDICINE | Facility: CLINIC | Age: 72
End: 2017-10-05

## 2017-10-05 NOTE — TELEPHONE ENCOUNTER
Pt scheduled to do her shot on the 16th of this month. This falls within the 30 day guidelines of when she last got it, but the last shot on 9/14/17 was never documented. I believe due to this I will need new orders to start on the 16th.

## 2017-10-05 NOTE — TELEPHONE ENCOUNTER
----- Message from Danae Elliott sent at 10/5/2017  2:54 PM CDT -----  Patient needs to schedule her B-12 appointment around October 16.  Call her at [phone].                                          hilario

## 2017-10-06 DIAGNOSIS — E53.8 VITAMIN B12 DEFICIENCY DISEASE: ICD-10-CM

## 2017-10-06 RX ORDER — CYANOCOBALAMIN 1000 UG/ML
1000 INJECTION, SOLUTION INTRAMUSCULAR; SUBCUTANEOUS
Status: DISCONTINUED | OUTPATIENT
Start: 2017-10-16 | End: 2018-03-14

## 2017-10-16 ENCOUNTER — CLINICAL SUPPORT (OUTPATIENT)
Dept: INTERNAL MEDICINE | Facility: CLINIC | Age: 72
End: 2017-10-16
Payer: COMMERCIAL

## 2017-10-16 PROCEDURE — 96372 THER/PROPH/DIAG INJ SC/IM: CPT | Mod: S$GLB,,, | Performed by: FAMILY MEDICINE

## 2017-10-16 RX ADMIN — CYANOCOBALAMIN 1000 MCG: 1000 INJECTION, SOLUTION INTRAMUSCULAR; SUBCUTANEOUS at 10:10

## 2017-10-24 ENCOUNTER — TELEPHONE (OUTPATIENT)
Dept: INTERNAL MEDICINE | Facility: CLINIC | Age: 72
End: 2017-10-24

## 2017-10-24 ENCOUNTER — PATIENT MESSAGE (OUTPATIENT)
Dept: INTERNAL MEDICINE | Facility: CLINIC | Age: 72
End: 2017-10-24

## 2017-10-25 ENCOUNTER — OFFICE VISIT (OUTPATIENT)
Dept: INTERNAL MEDICINE | Facility: CLINIC | Age: 72
End: 2017-10-25
Payer: COMMERCIAL

## 2017-10-25 VITALS
SYSTOLIC BLOOD PRESSURE: 134 MMHG | DIASTOLIC BLOOD PRESSURE: 80 MMHG | OXYGEN SATURATION: 100 % | WEIGHT: 114.88 LBS | HEIGHT: 67 IN | BODY MASS INDEX: 18.03 KG/M2 | TEMPERATURE: 97 F | HEART RATE: 72 BPM

## 2017-10-25 DIAGNOSIS — J30.89 ACUTE NON-SEASONAL ALLERGIC RHINITIS, UNSPECIFIED TRIGGER: Primary | ICD-10-CM

## 2017-10-25 PROCEDURE — 99999 PR PBB SHADOW E&M-EST. PATIENT-LVL III: CPT | Mod: PBBFAC,,, | Performed by: PHYSICIAN ASSISTANT

## 2017-10-25 PROCEDURE — 99213 OFFICE O/P EST LOW 20 MIN: CPT | Mod: S$GLB,,, | Performed by: PHYSICIAN ASSISTANT

## 2017-10-25 RX ORDER — LEVOCETIRIZINE DIHYDROCHLORIDE 5 MG/1
5 TABLET, FILM COATED ORAL NIGHTLY
Qty: 30 TABLET | Refills: 0 | Status: SHIPPED | OUTPATIENT
Start: 2017-10-25 | End: 2018-01-19

## 2017-10-25 NOTE — PROGRESS NOTES
Subjective:       Patient ID: Rosio Solis is a 72 y.o. female.    Chief Complaint: Sinusitis    Sinusitis   This is a recurrent problem. The current episode started 1 to 4 weeks ago. The problem is unchanged. There has been no fever. She is experiencing no pain. Pertinent negatives include no chills, congestion, coughing, diaphoresis, ear pain, headaches, hoarse voice, neck pain, shortness of breath, sinus pressure, sneezing or swollen glands. (Post nasal drip, nasal congestion ) Past treatments include saline sprays and oral decongestants. The treatment provided no relief.       Past Medical History:   Diagnosis Date    Grief reaction with prolonged bereavement 11/30/2016    Vitamin B12 deficiency disease 11/30/2016    completed weekly shots, moving to monthly now.     Vitamin D deficiency disease 11/30/2016    on 2000 IU daily now.        Current Outpatient Prescriptions   Medication Sig Dispense Refill    acitretin (SORIATANE) 10 MG capsule Take 10 mg by mouth before breakfast.      butalbital-acetaminophen-caffeine -40 mg (FIORICET, ESGIC) -40 mg per tablet Take 1 tablet by mouth every 4 (four) hours as needed for Pain.      cholecalciferol, vitamin D3, (VITAMIN D3) 2,000 unit Cap Take 1 capsule (2,000 Units total) by mouth once daily. 100 capsule 3    cycloSPORINE (RESTASIS) 0.05 % ophthalmic emulsion 1 drop 2 (two) times daily.      estrogens, conjugated, (PREMARIN) 1.25 MG tablet Take 1.25 mg by mouth once daily.       Current Facility-Administered Medications   Medication Dose Route Frequency Provider Last Rate Last Dose    cyanocobalamin injection 1,000 mcg  1,000 mcg Subcutaneous Q30 Days Khadijah López MD   1,000 mcg at 10/16/17 1052       Review of Systems   Constitutional: Negative for chills and diaphoresis.   HENT: Negative for congestion, ear pain, hoarse voice, sinus pressure and sneezing.    Eyes: Negative.    Respiratory: Negative for cough and shortness of breath.   "  Musculoskeletal: Negative for neck pain.   Neurological: Negative for headaches.       Objective:   /80   Pulse 72   Temp 97.2 °F (36.2 °C) (Tympanic)   Ht 5' 7" (1.702 m)   Wt 52.1 kg (114 lb 13.8 oz)   SpO2 100%   BMI 17.99 kg/m²      Physical Exam   Constitutional: She is oriented to person, place, and time. She appears well-developed and well-nourished. No distress.   HENT:   Head: Normocephalic and atraumatic.   Right Ear: Hearing, tympanic membrane, external ear and ear canal normal.   Left Ear: Hearing, tympanic membrane, external ear and ear canal normal.   Nose: No sinus tenderness. Right sinus exhibits no maxillary sinus tenderness and no frontal sinus tenderness. Left sinus exhibits no maxillary sinus tenderness and no frontal sinus tenderness.   Mouth/Throat: Uvula is midline, oropharynx is clear and moist and mucous membranes are normal. No oropharyngeal exudate, posterior oropharyngeal edema, posterior oropharyngeal erythema or tonsillar abscesses.   Eyes: Conjunctivae and EOM are normal. Pupils are equal, round, and reactive to light.   Neck: Normal range of motion. Neck supple.   Cardiovascular: Normal rate, regular rhythm and normal heart sounds.    Pulmonary/Chest: Effort normal and breath sounds normal. No respiratory distress.   Neurological: She is alert and oriented to person, place, and time.   Skin: Skin is warm.   Psychiatric: She has a normal mood and affect. Her behavior is normal. Judgment and thought content normal.   Vitals reviewed.        Lab Results   Component Value Date    WBC 5.15 09/12/2017    HGB 11.8 (L) 09/12/2017    HCT 36.3 (L) 09/12/2017     09/12/2017    CHOL 258 (H) 10/26/2016    TRIG 78 10/26/2016    HDL >140 (H) 10/26/2016    ALT 14 09/12/2017    AST 21 09/12/2017     09/12/2017    K 4.4 09/12/2017     09/12/2017    CREATININE 0.7 09/12/2017    BUN 13 09/12/2017    CO2 29 09/12/2017    TSH 1.087 10/26/2016    INR 1.0 06/19/2014 "       Assessment:       1. Acute non-seasonal allergic rhinitis, unspecified trigger        Plan:   Acute non-seasonal allergic rhinitis, unspecified trigger    -     levocetirizine (XYZAL) 5 MG tablet; Take 1 tablet (5 mg total) by mouth every evening.  Dispense: 30 tablet; Refill: 0    Patient is on soriatane, which has common SE of rhinitis among other things, suggest to start xyzal at night   No signs of infection

## 2017-11-13 ENCOUNTER — CLINICAL SUPPORT (OUTPATIENT)
Dept: INTERNAL MEDICINE | Facility: CLINIC | Age: 72
End: 2017-11-13
Payer: COMMERCIAL

## 2017-11-13 DIAGNOSIS — E53.8 B12 DEFICIENCY: Primary | ICD-10-CM

## 2017-11-13 PROCEDURE — 99999 PR PBB SHADOW E&M-EST. PATIENT-LVL I: CPT | Mod: PBBFAC,,,

## 2017-11-13 PROCEDURE — 96372 THER/PROPH/DIAG INJ SC/IM: CPT | Mod: S$GLB,,, | Performed by: FAMILY MEDICINE

## 2017-11-13 RX ADMIN — CYANOCOBALAMIN 1000 MCG: 1000 INJECTION, SOLUTION INTRAMUSCULAR; SUBCUTANEOUS at 10:11

## 2017-11-13 NOTE — PROGRESS NOTES
Pt in clinic for B12 injection. She stated, she had been having pain to her left arm when she moves it a certain way. She doesn't know if it from the shots are not. She has been getting her shots to that same arm since she started B12. Advised pt to rotate arms each month. Pt still requested to have b12 administered to left arm. After administering the injection and lightly rubbing her arm and applying the band aid, pt complained that it hurt. Advised pt to be seen for eval of her arm. She didn't schedule an appt with Dr. López or any other provider for her arm pain.

## 2017-11-27 ENCOUNTER — PATIENT MESSAGE (OUTPATIENT)
Dept: INTERNAL MEDICINE | Facility: CLINIC | Age: 72
End: 2017-11-27

## 2017-12-13 ENCOUNTER — CLINICAL SUPPORT (OUTPATIENT)
Dept: INTERNAL MEDICINE | Facility: CLINIC | Age: 72
End: 2017-12-13
Payer: COMMERCIAL

## 2017-12-13 PROCEDURE — 96372 THER/PROPH/DIAG INJ SC/IM: CPT | Mod: S$GLB,,, | Performed by: FAMILY MEDICINE

## 2017-12-13 RX ADMIN — CYANOCOBALAMIN 1000 MCG: 1000 INJECTION, SOLUTION INTRAMUSCULAR; SUBCUTANEOUS at 10:12

## 2018-01-10 ENCOUNTER — PATIENT MESSAGE (OUTPATIENT)
Dept: INTERNAL MEDICINE | Facility: CLINIC | Age: 73
End: 2018-01-10

## 2018-01-11 ENCOUNTER — CLINICAL SUPPORT (OUTPATIENT)
Dept: INTERNAL MEDICINE | Facility: CLINIC | Age: 73
End: 2018-01-11
Payer: COMMERCIAL

## 2018-01-11 DIAGNOSIS — E53.8 B12 DEFICIENCY: Primary | ICD-10-CM

## 2018-01-11 PROCEDURE — 96372 THER/PROPH/DIAG INJ SC/IM: CPT | Mod: S$GLB,,, | Performed by: INTERNAL MEDICINE

## 2018-01-11 PROCEDURE — 99999 PR PBB SHADOW E&M-EST. PATIENT-LVL I: CPT | Mod: PBBFAC,,,

## 2018-01-11 RX ADMIN — CYANOCOBALAMIN 1000 MCG: 1000 INJECTION, SOLUTION INTRAMUSCULAR; SUBCUTANEOUS at 10:01

## 2018-01-11 NOTE — PROGRESS NOTES
After obtaining consent, and per orders of Dr. Khadijah López, injection of 1000mcg of B12 given by Danielle Nguyen. Patient instructed to remain in clinic for 20 minutes afterwards, and to report any adverse reaction to me immediately.    Danielle Nguyen, LPN

## 2018-01-18 ENCOUNTER — PATIENT MESSAGE (OUTPATIENT)
Dept: INTERNAL MEDICINE | Facility: CLINIC | Age: 73
End: 2018-01-18

## 2018-01-19 ENCOUNTER — OFFICE VISIT (OUTPATIENT)
Dept: INTERNAL MEDICINE | Facility: CLINIC | Age: 73
End: 2018-01-19
Payer: COMMERCIAL

## 2018-01-19 VITALS
DIASTOLIC BLOOD PRESSURE: 82 MMHG | SYSTOLIC BLOOD PRESSURE: 154 MMHG | BODY MASS INDEX: 17.92 KG/M2 | OXYGEN SATURATION: 96 % | WEIGHT: 114.19 LBS | HEART RATE: 66 BPM | HEIGHT: 67 IN | TEMPERATURE: 97 F

## 2018-01-19 DIAGNOSIS — R03.0 ELEVATED BP WITHOUT DIAGNOSIS OF HYPERTENSION: Primary | ICD-10-CM

## 2018-01-19 DIAGNOSIS — M54.41 ACUTE RIGHT-SIDED LOW BACK PAIN WITH RIGHT-SIDED SCIATICA: ICD-10-CM

## 2018-01-19 PROCEDURE — 99213 OFFICE O/P EST LOW 20 MIN: CPT | Mod: S$GLB,,, | Performed by: PHYSICIAN ASSISTANT

## 2018-01-19 PROCEDURE — 99999 PR PBB SHADOW E&M-EST. PATIENT-LVL III: CPT | Mod: PBBFAC,,, | Performed by: PHYSICIAN ASSISTANT

## 2018-01-19 RX ORDER — NAPROXEN 500 MG/1
500 TABLET ORAL 2 TIMES DAILY
Qty: 20 TABLET | Refills: 0 | Status: SHIPPED | OUTPATIENT
Start: 2018-01-19 | End: 2018-01-29

## 2018-01-19 RX ORDER — GABAPENTIN 300 MG/1
300 CAPSULE ORAL EVERY 12 HOURS PRN
Qty: 30 CAPSULE | Refills: 0 | Status: SHIPPED | OUTPATIENT
Start: 2018-01-19 | End: 2018-02-01 | Stop reason: SDUPTHER

## 2018-01-19 NOTE — PATIENT INSTRUCTIONS

## 2018-01-19 NOTE — PROGRESS NOTES
"Subjective:       Patient ID: Rosio Solis is a 72 y.o. female.    Chief Complaint: Back Pain    Back Pain   This is a new problem. Episode onset: saturday- 6 days ago  The problem occurs daily. The problem is unchanged. The pain is present in the gluteal and sacro-iliac. The quality of the pain is described as aching. The pain radiates to the right thigh. The pain is at a severity of 7/10. The pain is moderate. The pain is worse during the day. The symptoms are aggravated by sitting, twisting and position. Associated symptoms include leg pain (back of thigh - right ). Pertinent negatives include no abdominal pain, bladder incontinence, bowel incontinence, chest pain, dysuria, fever, headaches, numbness, paresis, paresthesias, pelvic pain, perianal numbness, tingling, weakness or weight loss. Risk factors include menopause. She has tried NSAIDs for the symptoms.     Took aleve, states she is "not sure" if it is helping or not. Using heating pad     Past Medical History:   Diagnosis Date    Grief reaction with prolonged bereavement 11/30/2016    Vitamin B12 deficiency disease 11/30/2016    completed weekly shots, moving to monthly now.     Vitamin D deficiency disease 11/30/2016    on 2000 IU daily now.        Current Outpatient Prescriptions   Medication Sig Dispense Refill    butalbital-acetaminophen-caffeine -40 mg (FIORICET, ESGIC) -40 mg per tablet Take 1 tablet by mouth every 4 (four) hours as needed for Pain.      cholecalciferol, vitamin D3, (VITAMIN D3) 2,000 unit Cap Take 1 capsule (2,000 Units total) by mouth once daily. 100 capsule 3    cycloSPORINE (RESTASIS) 0.05 % ophthalmic emulsion 1 drop 2 (two) times daily.      estrogens, conjugated, (PREMARIN) 1.25 MG tablet Take 1.25 mg by mouth once daily.       Current Facility-Administered Medications   Medication Dose Route Frequency Provider Last Rate Last Dose    cyanocobalamin injection 1,000 mcg  1,000 mcg Subcutaneous Q30 Days " "Khadijah López MD   1,000 mcg at 01/11/18 1003       Review of Systems   Constitutional: Negative for fever and weight loss.   Cardiovascular: Negative for chest pain.   Gastrointestinal: Negative for abdominal pain and bowel incontinence.   Genitourinary: Negative for bladder incontinence, dysuria and pelvic pain.   Musculoskeletal: Positive for back pain.   Neurological: Negative for tingling, weakness, numbness, headaches and paresthesias.       Objective:   BP (!) 154/82   Pulse 66   Temp 97 °F (36.1 °C)   Ht 5' 7" (1.702 m)   Wt 51.8 kg (114 lb 3.2 oz)   SpO2 96%   BMI 17.89 kg/m²      Physical Exam   Constitutional: She appears well-developed and well-nourished. No distress.   HENT:   Head: Normocephalic and atraumatic.   Musculoskeletal:        Right hip: Normal.        Right knee: Normal.        Right upper leg: She exhibits tenderness. She exhibits no bony tenderness.        Legs:  TTP along sciatic notch pain along nerve distribution   Has mild, flat area of redness. Patient states likely from her heating pad and massaging the area    Psychiatric: She has a normal mood and affect. Her behavior is normal. Thought content normal.         Lab Results   Component Value Date    WBC 5.15 09/12/2017    HGB 11.8 (L) 09/12/2017    HCT 36.3 (L) 09/12/2017     09/12/2017    CHOL 258 (H) 10/26/2016    TRIG 78 10/26/2016    HDL >140 (H) 10/26/2016    ALT 14 09/12/2017    AST 21 09/12/2017     09/12/2017    K 4.4 09/12/2017     09/12/2017    CREATININE 0.7 09/12/2017    BUN 13 09/12/2017    CO2 29 09/12/2017    TSH 1.087 10/26/2016    INR 1.0 06/19/2014       Assessment:       1. Elevated BP without diagnosis of hypertension    2. Acute right-sided low back pain with right-sided sciatica        Plan:   Elevated BP without diagnosis of hypertension- BP elevated due to pain  Follow up next week if issue not resolved.     Acute right-sided low back pain with right-sided sciatica    discussed that " shingles could also be possibility as it does affect a nerve, no true vesicular rash however  Start NSAiD and gabapentin. Keep an eye out for rash/shingles development   Follow up next week if no improvement

## 2018-01-21 ENCOUNTER — PATIENT MESSAGE (OUTPATIENT)
Dept: INTERNAL MEDICINE | Facility: CLINIC | Age: 73
End: 2018-01-21

## 2018-01-25 ENCOUNTER — OFFICE VISIT (OUTPATIENT)
Dept: INTERNAL MEDICINE | Facility: CLINIC | Age: 73
End: 2018-01-25
Payer: COMMERCIAL

## 2018-01-25 VITALS
HEART RATE: 72 BPM | WEIGHT: 115.31 LBS | TEMPERATURE: 97 F | SYSTOLIC BLOOD PRESSURE: 126 MMHG | DIASTOLIC BLOOD PRESSURE: 84 MMHG | HEIGHT: 67 IN | BODY MASS INDEX: 18.1 KG/M2

## 2018-01-25 DIAGNOSIS — M54.31 SCIATICA OF RIGHT SIDE: Primary | ICD-10-CM

## 2018-01-25 DIAGNOSIS — R21 RASH AND NONSPECIFIC SKIN ERUPTION: ICD-10-CM

## 2018-01-25 PROCEDURE — 99999 PR PBB SHADOW E&M-EST. PATIENT-LVL III: CPT | Mod: PBBFAC,,, | Performed by: FAMILY MEDICINE

## 2018-01-25 PROCEDURE — 99214 OFFICE O/P EST MOD 30 MIN: CPT | Mod: S$GLB,,, | Performed by: FAMILY MEDICINE

## 2018-01-26 NOTE — PROGRESS NOTES
Subjective:      Patient ID: Rosio Solis is a 72 y.o. female.    Chief Complaint: Hip Pain and Leg Pain    Disclaimer:  This note is prepared using voice recognition software and as such is likely to have errors and has not been proof read. Please contact me for questions.     Patient's coming in today for follow-up of urgent visit with my physician assistant for leg pain.  She reports she had severe right sided leg pain that started on January 11.  This is approximately 2 weeks ago.  She was staying with her family and injured having to go up and down stairs a good bit in the house which was abnormal for her and ended up also sleeping on a couch mainly on her right side.  When she awoke in the next one she had severe right-sided leg pain shooting down into the front of her thigh.  She was able to see my physician assistant on that Friday.  She was examined and determined to most likely be having sciatica.  She was treated with Naprosyn and gabapentin.  Since that time she has improved some but not fully resolved.  The patient also did have an area on her back right buttock area that was possibly concerning for shingles.  She does report that it never broke out into any further rash.  She is very sensitive skin however and bruises very easily.  She did try a few exercises that ultimately he has not done any formalized physical therapy or home strengthening program.  She does seem to think that the gabapentin and naproxen are helping but feels like she needs more to combat the pain.  She wants to improve this and that she does not have any further issues.  There are no x-rays of note of her back.  She does not feel like she needs an x-ray at this time.  She did not have any type of traumatic event.  She's hesitant to do physical therapy mainly because she doesn't like to be enlarged groups however she upon further discussion she does feel like this might be one of the best ways to help improve her leg pain and  "discomfort.        Lab Results   Component Value Date    WBC 5.15 09/12/2017    HGB 11.8 (L) 09/12/2017    HCT 36.3 (L) 09/12/2017     09/12/2017    CHOL 258 (H) 10/26/2016    TRIG 78 10/26/2016    HDL >140 (H) 10/26/2016    ALT 14 09/12/2017    AST 21 09/12/2017     09/12/2017    K 4.4 09/12/2017     09/12/2017    CREATININE 0.7 09/12/2017    BUN 13 09/12/2017    CO2 29 09/12/2017    TSH 1.087 10/26/2016    INR 1.0 06/19/2014       Review of Systems   Constitutional: Positive for activity change. Negative for appetite change, fatigue and fever.   Musculoskeletal: Positive for arthralgias, back pain, gait problem and myalgias. Negative for joint swelling, neck pain and neck stiffness.   Skin: Positive for color change. Negative for pallor, rash and wound.   Neurological: Negative for weakness and numbness.   Hematological: Negative for adenopathy. Bruises/bleeds easily.     Objective:     Vitals:    01/25/18 1226   BP: 126/84   Pulse: 72   Temp: 97.2 °F (36.2 °C)   TempSrc: Tympanic   Weight: 52.3 kg (115 lb 4.8 oz)   Height: 5' 7" (1.702 m)     Physical Exam   Constitutional: She appears well-developed and well-nourished.   Thin white female   Musculoskeletal:        Lumbar back: She exhibits decreased range of motion, tenderness, bony tenderness and pain. She exhibits no spasm.        Back:    Negative bilateral straight leg raise today.  Decreased flexibility and right and left legs of hamstring stretches.  Does have pain with forward flexion and extension.  Normal rotation.  No swelling or edema noted.  No numbness or radiculopathy symptoms.   Neurological: She has normal strength. She displays no atrophy and no tremor. No sensory deficit. Coordination and gait normal.   Skin: Skin is warm and dry. Rash noted. Rash is macular.        Vitals reviewed.    Assessment:     1. Sciatica of right side    2. Rash and nonspecific skin eruption      Plan:   Rosio was seen today for hip pain and leg " pain.    Diagnoses and all orders for this visit:    Sciatica of right side  Comments:  improving , cont with gabapentin can increase to tid dosing if needed, naprosyn 500mg bid. refer to PT. will advise to do repairs physical therapy.  Stressed importance of no benefit with x-rays at this time as it is not going to change our current management.  However if symptoms are not improved in 1 month would recommend then at that time consideration for x-ray due to concerns for possible osteoporosis  Orders:  -     Ambulatory Referral to Physical/Occupational Therapy    Rash and nonspecific skin eruption-noted suspect sensitivity to heat.  Advised continuation of monitoring.  Unlikely shingles at this time      Time spent: 25 minutes in face to face discussion concerning diagnosis, prognosis, review of lab and test results, benefits of treatment as well as management of disease, counseling of patient and coordination of care between various health care providers . Greater than half the time spent was used for coordination of care and counseling of patient.         Follow-up if symptoms worsen or fail to improve.

## 2018-02-01 ENCOUNTER — PATIENT MESSAGE (OUTPATIENT)
Dept: INTERNAL MEDICINE | Facility: CLINIC | Age: 73
End: 2018-02-01

## 2018-02-01 RX ORDER — GABAPENTIN 300 MG/1
300 CAPSULE ORAL 3 TIMES DAILY
Qty: 90 CAPSULE | Refills: 1 | Status: SHIPPED | OUTPATIENT
Start: 2018-02-01 | End: 2018-03-14

## 2018-02-08 ENCOUNTER — PATIENT MESSAGE (OUTPATIENT)
Dept: INTERNAL MEDICINE | Facility: CLINIC | Age: 73
End: 2018-02-08

## 2018-02-12 ENCOUNTER — PATIENT MESSAGE (OUTPATIENT)
Dept: INTERNAL MEDICINE | Facility: CLINIC | Age: 73
End: 2018-02-12

## 2018-03-06 ENCOUNTER — LAB VISIT (OUTPATIENT)
Dept: LAB | Facility: HOSPITAL | Age: 73
End: 2018-03-06
Attending: FAMILY MEDICINE
Payer: COMMERCIAL

## 2018-03-06 DIAGNOSIS — J30.89 CHRONIC NONSEASONAL ALLERGIC RHINITIS DUE TO OTHER ALLERGEN: ICD-10-CM

## 2018-03-06 DIAGNOSIS — L40.9 PSORIASIS: ICD-10-CM

## 2018-03-06 DIAGNOSIS — F43.29 GRIEF REACTION WITH PROLONGED BEREAVEMENT: ICD-10-CM

## 2018-03-06 DIAGNOSIS — D51.9 ANEMIA DUE TO VITAMIN B12 DEFICIENCY, UNSPECIFIED B12 DEFICIENCY TYPE: ICD-10-CM

## 2018-03-06 DIAGNOSIS — E53.8 VITAMIN B12 DEFICIENCY DISEASE: ICD-10-CM

## 2018-03-06 LAB
ALBUMIN SERPL BCP-MCNC: 4 G/DL
ALP SERPL-CCNC: 70 U/L
ALT SERPL W/O P-5'-P-CCNC: 8 U/L
ANION GAP SERPL CALC-SCNC: 11 MMOL/L
AST SERPL-CCNC: 15 U/L
BASOPHILS # BLD AUTO: 0.06 K/UL
BASOPHILS NFR BLD: 1.5 %
BILIRUB SERPL-MCNC: 0.4 MG/DL
BUN SERPL-MCNC: 12 MG/DL
CALCIUM SERPL-MCNC: 9.8 MG/DL
CHLORIDE SERPL-SCNC: 101 MMOL/L
CO2 SERPL-SCNC: 28 MMOL/L
CREAT SERPL-MCNC: 0.7 MG/DL
DIFFERENTIAL METHOD: ABNORMAL
EOSINOPHIL # BLD AUTO: 0.1 K/UL
EOSINOPHIL NFR BLD: 1.2 %
ERYTHROCYTE [DISTWIDTH] IN BLOOD BY AUTOMATED COUNT: 13.2 %
EST. GFR  (AFRICAN AMERICAN): >60 ML/MIN/1.73 M^2
EST. GFR  (NON AFRICAN AMERICAN): >60 ML/MIN/1.73 M^2
FERRITIN SERPL-MCNC: 50 NG/ML
GLUCOSE SERPL-MCNC: 80 MG/DL
HCT VFR BLD AUTO: 38.2 %
HGB BLD-MCNC: 11.9 G/DL
IMM GRANULOCYTES # BLD AUTO: 0 K/UL
IMM GRANULOCYTES NFR BLD AUTO: 0 %
IRON SERPL-MCNC: 107 UG/DL
LYMPHOCYTES # BLD AUTO: 1.3 K/UL
LYMPHOCYTES NFR BLD: 31.8 %
MCH RBC QN AUTO: 30.9 PG
MCHC RBC AUTO-ENTMCNC: 31.2 G/DL
MCV RBC AUTO: 99 FL
MONOCYTES # BLD AUTO: 0.4 K/UL
MONOCYTES NFR BLD: 8.7 %
NEUTROPHILS # BLD AUTO: 2.3 K/UL
NEUTROPHILS NFR BLD: 56.8 %
NRBC BLD-RTO: 0 /100 WBC
PLATELET # BLD AUTO: 295 K/UL
PMV BLD AUTO: 11.2 FL
POTASSIUM SERPL-SCNC: 4.3 MMOL/L
PROT SERPL-MCNC: 7.5 G/DL
RBC # BLD AUTO: 3.85 M/UL
SATURATED IRON: 22 %
SODIUM SERPL-SCNC: 140 MMOL/L
TOTAL IRON BINDING CAPACITY: 488 UG/DL
TRANSFERRIN SERPL-MCNC: 330 MG/DL
VIT B12 SERPL-MCNC: 683 PG/ML
WBC # BLD AUTO: 4.12 K/UL

## 2018-03-06 PROCEDURE — 83540 ASSAY OF IRON: CPT

## 2018-03-06 PROCEDURE — 82728 ASSAY OF FERRITIN: CPT

## 2018-03-06 PROCEDURE — 80053 COMPREHEN METABOLIC PANEL: CPT

## 2018-03-06 PROCEDURE — 82607 VITAMIN B-12: CPT

## 2018-03-06 PROCEDURE — 85025 COMPLETE CBC W/AUTO DIFF WBC: CPT

## 2018-03-06 PROCEDURE — 36415 COLL VENOUS BLD VENIPUNCTURE: CPT | Mod: PO

## 2018-03-14 ENCOUNTER — OFFICE VISIT (OUTPATIENT)
Dept: INTERNAL MEDICINE | Facility: CLINIC | Age: 73
End: 2018-03-14
Payer: COMMERCIAL

## 2018-03-14 VITALS
TEMPERATURE: 97 F | BODY MASS INDEX: 17.75 KG/M2 | WEIGHT: 113.13 LBS | SYSTOLIC BLOOD PRESSURE: 122 MMHG | HEART RATE: 74 BPM | HEIGHT: 67 IN | DIASTOLIC BLOOD PRESSURE: 78 MMHG

## 2018-03-14 DIAGNOSIS — D51.9 ANEMIA DUE TO VITAMIN B12 DEFICIENCY, UNSPECIFIED B12 DEFICIENCY TYPE: ICD-10-CM

## 2018-03-14 DIAGNOSIS — L40.9 PSORIASIS: ICD-10-CM

## 2018-03-14 DIAGNOSIS — R51.9 CHRONIC NONINTRACTABLE HEADACHE, UNSPECIFIED HEADACHE TYPE: ICD-10-CM

## 2018-03-14 DIAGNOSIS — E53.8 VITAMIN B12 DEFICIENCY DISEASE: ICD-10-CM

## 2018-03-14 DIAGNOSIS — Z79.890 HORMONE REPLACEMENT THERAPY: ICD-10-CM

## 2018-03-14 DIAGNOSIS — Z00.00 ROUTINE GENERAL MEDICAL EXAMINATION AT A HEALTH CARE FACILITY: Primary | ICD-10-CM

## 2018-03-14 DIAGNOSIS — M54.30 SCIATICA, UNSPECIFIED LATERALITY: ICD-10-CM

## 2018-03-14 DIAGNOSIS — G89.29 CHRONIC NONINTRACTABLE HEADACHE, UNSPECIFIED HEADACHE TYPE: ICD-10-CM

## 2018-03-14 DIAGNOSIS — Z13.820 ENCOUNTER FOR SCREENING FOR OSTEOPOROSIS: ICD-10-CM

## 2018-03-14 PROCEDURE — 99397 PER PM REEVAL EST PAT 65+ YR: CPT | Mod: 25,S$GLB,, | Performed by: FAMILY MEDICINE

## 2018-03-14 PROCEDURE — 96372 THER/PROPH/DIAG INJ SC/IM: CPT | Mod: S$GLB,,, | Performed by: FAMILY MEDICINE

## 2018-03-14 PROCEDURE — 99999 PR PBB SHADOW E&M-EST. PATIENT-LVL III: CPT | Mod: PBBFAC,,, | Performed by: FAMILY MEDICINE

## 2018-03-14 RX ORDER — BUTALBITAL, ACETAMINOPHEN AND CAFFEINE 50; 325; 40 MG/1; MG/1; MG/1
1 TABLET ORAL EVERY 4 HOURS PRN
Qty: 30 TABLET | Refills: 1 | Status: SHIPPED | OUTPATIENT
Start: 2018-03-14 | End: 2019-03-13 | Stop reason: SDUPTHER

## 2018-03-14 RX ORDER — BIOTIN 1000 MCG
5000 TABLET,CHEWABLE ORAL DAILY
Qty: 100 TABLET | Refills: 0
Start: 2018-03-14 | End: 2020-07-20

## 2018-03-14 RX ORDER — CYANOCOBALAMIN 1000 UG/ML
1000 INJECTION, SOLUTION INTRAMUSCULAR; SUBCUTANEOUS
Status: ACTIVE | OUTPATIENT
Start: 2018-03-14 | End: 2019-03-09

## 2018-03-14 RX ADMIN — CYANOCOBALAMIN 1000 MCG: 1000 INJECTION, SOLUTION INTRAMUSCULAR; SUBCUTANEOUS at 01:03

## 2018-03-18 PROBLEM — R51.9 CHRONIC NONINTRACTABLE HEADACHE: Status: ACTIVE | Noted: 2018-03-18

## 2018-03-18 PROBLEM — G89.29 CHRONIC NONINTRACTABLE HEADACHE: Status: ACTIVE | Noted: 2018-03-18

## 2018-03-18 PROBLEM — Z79.890 HORMONE REPLACEMENT THERAPY: Status: ACTIVE | Noted: 2018-03-18

## 2018-03-18 NOTE — PROGRESS NOTES
Subjective:      Patient ID: Rosio Solis is a 72 y.o. female.    Chief Complaint: Annual Exam    Disclaimer:  This note is prepared using voice recognition software and as such is likely to have errors and has not been proof read. Please contact me for questions.     Patient is a 72-year-old female coming in today for prevention exam .   Since I last saw her she's been attending physical therapy with [facility] and her back pain is almost fully resolved.  She's doing a lot of home exercises.  She's fully off the gabapentin and the tramadol at this time.  She's very pleased with her current physical therapy.    She still sees Dr. Bertrand who treats her multiple skin issues and also the psoriasis in her scalp.    She's been seeing an outside gynecologist Dr. Jacques pedraza in [facility].  She reports her last Pap smear has been well over 5 years.  She did her last mammogram during that time as well.  She is currently on estrogen therapy.  She does not want to do further mammograms.  She has not been sexually active in the last 5 years and has never had an abnormal Pap smear.  For this reason she prefer not to do any further pelvic exams either.  She is due for a bone density due to her BMI and reports that her last one was over 5 years ago.  She is on the estrogen would be willing to do this at [facility].  She is willing to have us also obtain her last one which was done by Dr. Jacques pedraza in the [facility] area with his phone number being [phone].    Her mood has been stable.    Her B12 levels have improved with injections monthly.  She is still needing to continue it as she has an elevated MCV still.  She still does over-the-counter vitamin D 2000 IUs daily.    She has a history of recurrent migraines for which in the past she would get Fioricet.  She get this from her gynecologist.  She's requesting a refill of this today as well.              Lab Results   Component Value  "Date    WBC 4.12 03/06/2018    HGB 11.9 (L) 03/06/2018    HCT 38.2 03/06/2018     03/06/2018    CHOL 258 (H) 10/26/2016    TRIG 78 10/26/2016    HDL >140 (H) 10/26/2016    ALT 8 (L) 03/06/2018    AST 15 03/06/2018     03/06/2018    K 4.3 03/06/2018     03/06/2018    CREATININE 0.7 03/06/2018    BUN 12 03/06/2018    CO2 28 03/06/2018    TSH 1.087 10/26/2016    INR 1.0 06/19/2014       Review of Systems   Constitutional: Negative for chills, fatigue and fever.   HENT: Negative for ear pain and trouble swallowing.    Eyes: Negative for pain and visual disturbance.   Respiratory: Negative for cough and shortness of breath.    Cardiovascular: Negative for chest pain and leg swelling.   Gastrointestinal: Negative for abdominal pain, blood in stool, nausea and vomiting.   Endocrine: Negative for cold intolerance and heat intolerance.   Genitourinary: Negative for dysuria and frequency.   Musculoskeletal: Negative for arthralgias, back pain, joint swelling, myalgias and neck pain.   Skin: Positive for color change and rash.   Neurological: Negative for dizziness and headaches.   Psychiatric/Behavioral: Negative for behavioral problems and sleep disturbance.     Objective:     Vitals:    03/14/18 1304   BP: 122/78   Pulse: 74   Temp: 96.9 °F (36.1 °C)   TempSrc: Tympanic   Weight: 51.3 kg (113 lb 1.5 oz)   Height: 5' 7" (1.702 m)     Physical Exam   Constitutional: She is oriented to person, place, and time. She appears well-developed and well-nourished.   Thin white female   HENT:   Head: Normocephalic and atraumatic.   Right Ear: External ear normal.   Left Ear: External ear normal.   Mouth/Throat: Oropharynx is clear and moist.   Eyes: EOM are normal.   Neck: Normal range of motion. Neck supple. No thyromegaly present.   Cardiovascular: Normal rate and regular rhythm.  Exam reveals no gallop and no friction rub.    No murmur heard.  Pulmonary/Chest: Effort normal. No respiratory distress. She has no " wheezes. She has no rales.   Abdominal: Soft. Bowel sounds are normal. She exhibits no distension. There is no tenderness. There is no rebound.   Musculoskeletal: Normal range of motion. She exhibits no edema.   Lymphadenopathy:     She has no cervical adenopathy.   Neurological: She is alert and oriented to person, place, and time.   Skin: Skin is warm and dry. No rash noted. There is erythema.        Psychiatric: She has a normal mood and affect. Her behavior is normal. Judgment and thought content normal.   Vitals reviewed.    Assessment:     1. Routine general medical examination at a health care facility    2. Vitamin B12 deficiency disease    3. Anemia due to vitamin B12 deficiency, unspecified B12 deficiency type    4. Sciatica, unspecified laterality    5. Psoriasis    6. Encounter for screening for osteoporosis    7. Chronic nonintractable headache, unspecified headache type    8. Hormone replacement therapy      Plan:   Rosio was seen today for annual exam.    Diagnoses and all orders for this visit:    Routine general medical examination at a health care facility - labs reviewed. Discussed Health Maintenance issues.       Vitamin B12 deficiency disease  Comments:  stable, cont with b12 shots monthly. Labs reviewed.   Orders:  -     cyanocobalamin injection 1,000 mcg; Inject 1 mL (1,000 mcg total) into the skin every 30 days.    Anemia due to vitamin B12 deficiency, unspecified B12 deficiency type  Comments:  stable with b12 shots. Continue.     Sciatica, unspecified laterality  Comments:  improved with PT with [facility] Amato. off pain meds.     Psoriasis  Comments:  followed by Dr Marcos, improving. Continue with derm.     Encounter for screening for osteoporosis  -     DXA Bone Density Spine And Hip; Future- schedule due to BMI being low.  On estrogen.    Hormone replacement therapy-obtain outside records continue with Premarin at this time patient declines further mammograms    Headaches-refilled Fioricet  for the patient    Other orders  -     estrogens, conjugated, (PREMARIN) 1.25 MG tablet; Take 1 tablet (1.25 mg total) by mouth once daily.  -     butalbital-acetaminophen-caffeine -40 mg (FIORICET, ESGIC) -40 mg per tablet; Take 1 tablet by mouth every 4 (four) hours as needed for Pain or Headaches.  -     vit C-vit E-copper-zinc-lutein (PRESERVISION LUTEIN) 226 mg-200 unit -5 mg-0.8 mg Cap; 1 tablet po daily  -     biotin 1,000 mcg Chew; Take 5,000 mcg by mouth once daily.            Follow-up in about 6 months (around 9/14/2018) for chronic issues Dr López.

## 2018-03-23 ENCOUNTER — PATIENT MESSAGE (OUTPATIENT)
Dept: INTERNAL MEDICINE | Facility: CLINIC | Age: 73
End: 2018-03-23

## 2018-03-26 ENCOUNTER — APPOINTMENT (OUTPATIENT)
Dept: RADIOLOGY | Facility: CLINIC | Age: 73
End: 2018-03-26
Attending: FAMILY MEDICINE
Payer: COMMERCIAL

## 2018-03-26 DIAGNOSIS — Z13.820 ENCOUNTER FOR SCREENING FOR OSTEOPOROSIS: ICD-10-CM

## 2018-03-26 PROCEDURE — 77080 DXA BONE DENSITY AXIAL: CPT | Mod: TC,PO

## 2018-03-26 PROCEDURE — 77080 DXA BONE DENSITY AXIAL: CPT | Mod: 26,,, | Performed by: RADIOLOGY

## 2018-03-27 ENCOUNTER — PATIENT MESSAGE (OUTPATIENT)
Dept: INTERNAL MEDICINE | Facility: CLINIC | Age: 73
End: 2018-03-27

## 2018-04-12 ENCOUNTER — CLINICAL SUPPORT (OUTPATIENT)
Dept: INTERNAL MEDICINE | Facility: CLINIC | Age: 73
End: 2018-04-12
Payer: COMMERCIAL

## 2018-04-12 PROCEDURE — 96372 THER/PROPH/DIAG INJ SC/IM: CPT | Mod: S$GLB,,, | Performed by: FAMILY MEDICINE

## 2018-04-12 PROCEDURE — 99999 PR PBB SHADOW E&M-EST. PATIENT-LVL I: CPT | Mod: PBBFAC,,,

## 2018-04-12 RX ADMIN — CYANOCOBALAMIN 1000 MCG: 1000 INJECTION, SOLUTION INTRAMUSCULAR; SUBCUTANEOUS at 11:04

## 2018-04-29 ENCOUNTER — PATIENT MESSAGE (OUTPATIENT)
Dept: INTERNAL MEDICINE | Facility: CLINIC | Age: 73
End: 2018-04-29

## 2018-04-30 ENCOUNTER — OFFICE VISIT (OUTPATIENT)
Dept: INTERNAL MEDICINE | Facility: CLINIC | Age: 73
End: 2018-04-30
Payer: COMMERCIAL

## 2018-04-30 VITALS
DIASTOLIC BLOOD PRESSURE: 88 MMHG | TEMPERATURE: 98 F | HEART RATE: 72 BPM | BODY MASS INDEX: 18.17 KG/M2 | HEIGHT: 67 IN | WEIGHT: 115.75 LBS | SYSTOLIC BLOOD PRESSURE: 150 MMHG

## 2018-04-30 DIAGNOSIS — S51.011A SKIN TEAR OF RIGHT ELBOW WITHOUT COMPLICATION, INITIAL ENCOUNTER: Primary | ICD-10-CM

## 2018-04-30 PROCEDURE — 99999 PR PBB SHADOW E&M-EST. PATIENT-LVL III: CPT | Mod: PBBFAC,,, | Performed by: FAMILY MEDICINE

## 2018-04-30 PROCEDURE — 99214 OFFICE O/P EST MOD 30 MIN: CPT | Mod: S$GLB,,, | Performed by: FAMILY MEDICINE

## 2018-04-30 RX ORDER — CEPHALEXIN 250 MG/1
250 CAPSULE ORAL EVERY 12 HOURS
Qty: 10 CAPSULE | Refills: 0 | Status: SHIPPED | OUTPATIENT
Start: 2018-04-30 | End: 2018-09-12 | Stop reason: ALTCHOICE

## 2018-04-30 RX ORDER — MUPIROCIN 20 MG/G
OINTMENT TOPICAL 3 TIMES DAILY
Qty: 30 G | Refills: 1 | Status: SHIPPED | OUTPATIENT
Start: 2018-04-30 | End: 2020-07-20

## 2018-04-30 RX ORDER — AMOXICILLIN 500 MG
CAPSULE ORAL DAILY
COMMUNITY
End: 2019-03-13

## 2018-05-01 NOTE — PROGRESS NOTES
Subjective:      Patient ID: Rosio Solis is a 72 y.o. female.    Chief Complaint: skin tear    Disclaimer:  This note is prepared using voice recognition software and as such is likely to have errors and has not been proof read. Please contact me for questions.     Rosio Solis is a 72 y.o. female who presents today for urgent visit. The patient's PCP is Khadijah López MD. she reports over the weekend she was out of town attending her grandchildren stance recital when a person behind her on the stairs fell down.  She turned around to assist that person of however that person lost her footing and then grabbed her right bicep and arm area.  She is very thin skin and bruises easily and bleeds easily.  She immediately felt pain and discomfort at the skin level as a ripping sensation.  She noticed blood on her jacket.  She went ahead and went to the restroom and noticed that she had a significant skin tear.  She did take pictures of it.  She did apply of pressure dressing at the time.  Later that evening she ended up washing with soap and water when she came home.  She did apply triple anabolic ointment to the affected area.  She's coming in today to have further looked at.  The event happened yesterday.  Her friend is a nurse that helped her to get it improved.        Lab Results   Component Value Date    WBC 4.12 03/06/2018    HGB 11.9 (L) 03/06/2018    HCT 38.2 03/06/2018     03/06/2018    CHOL 258 (H) 10/26/2016    TRIG 78 10/26/2016    HDL >140 (H) 10/26/2016    ALT 8 (L) 03/06/2018    AST 15 03/06/2018     03/06/2018    K 4.3 03/06/2018     03/06/2018    CREATININE 0.7 03/06/2018    BUN 12 03/06/2018    CO2 28 03/06/2018    TSH 1.087 10/26/2016    INR 1.0 06/19/2014       Review of Systems   Constitutional: Negative for activity change, appetite change, chills, fatigue and fever.   Musculoskeletal: Negative for arthralgias and myalgias.   Skin: Positive for color change and wound.  "  Psychiatric/Behavioral: The patient is nervous/anxious.      Objective:     Vitals:    04/30/18 1043   BP: (!) 150/88   Pulse: 72   Temp: 97.7 °F (36.5 °C)   TempSrc: Tympanic   Weight: 52.5 kg (115 lb 11.9 oz)   Height: 5' 7" (1.702 m)     Physical Exam   Constitutional: She appears well-developed. She appears distressed.   Skin: Skin is warm and dry. Abrasion and bruising noted. No rash noted. There is erythema.        Psychiatric: Her speech is normal. Her mood appears anxious.   Vitals reviewed.    Assessment:     1. Skin tear of right elbow without complication, initial encounter      Plan:   Rosio was seen today for skin tear.    Diagnoses and all orders for this visit:    Skin tear of right elbow without complication, initial encounter-area redressed today.  Apply Bactroban.  Will place patient on Keflex for the next 5 days due to concerns for potential for infection.  She does have assistance at home with a friend to help for dressing changes.  She will return to clinic in 2 weeks for her B12 shot and at that time my nurse will evaluated to look for wound healing.  At this time she does not qualify for home health wound care because she is ambulatory and able to drive without much issues.  If it seems to not be improving then we can place her in possibly dermatology appointment as she sees Dr. Bertrand or even wound care outpatient.  Patient was in agreement with the plan.    Other orders  -     cephALEXin (KEFLEX) 250 MG capsule; Take 1 capsule (250 mg total) by mouth every 12 (twelve) hours.  -     mupirocin (BACTROBAN) 2 % ointment; Apply topically 3 (three) times daily.            Follow-up if symptoms worsen or fail to improve.      Time spent: 25 minutes in face to face discussion concerning diagnosis, prognosis, review of lab and test results, benefits of treatment as well as management of disease, counseling of patient and coordination of care between various health care providers . Greater than half " the time spent was used for coordination of care and counseling of patient.

## 2018-05-02 ENCOUNTER — PATIENT MESSAGE (OUTPATIENT)
Dept: INTERNAL MEDICINE | Facility: CLINIC | Age: 73
End: 2018-05-02

## 2018-05-09 ENCOUNTER — PATIENT MESSAGE (OUTPATIENT)
Dept: INTERNAL MEDICINE | Facility: CLINIC | Age: 73
End: 2018-05-09

## 2018-05-11 ENCOUNTER — CLINICAL SUPPORT (OUTPATIENT)
Dept: INTERNAL MEDICINE | Facility: CLINIC | Age: 73
End: 2018-05-11
Payer: COMMERCIAL

## 2018-05-11 ENCOUNTER — PATIENT MESSAGE (OUTPATIENT)
Dept: INTERNAL MEDICINE | Facility: CLINIC | Age: 73
End: 2018-05-11

## 2018-05-11 PROCEDURE — 96372 THER/PROPH/DIAG INJ SC/IM: CPT | Mod: S$GLB,,, | Performed by: FAMILY MEDICINE

## 2018-05-11 RX ADMIN — CYANOCOBALAMIN 1000 MCG: 1000 INJECTION, SOLUTION INTRAMUSCULAR; SUBCUTANEOUS at 10:05

## 2018-05-11 NOTE — TELEPHONE ENCOUNTER
Dr. López, pt came in today for her B12. She had just wrapped her arm and had somewhere to go so I did not get her to remove it so I could see it. She did show me the picture and I had asked her to send it to us through [facility]. She was concerned about the places that may need to be trimmed. Also, she stated that she was having some redness in the arm as well as the white spots on part of the area. I have her a scheduled appt for Monday to see you in case it is needed. I told her to watch it over the weekend to make sure the redness did not get worse nor did it start to hurt or be warm to touch. She has taken a picture of it everyday and has been watching it closely. If you think she needs the appt on Monday I will keep it for her if not then I told her I would let her know. If it got better over the weekend she would let us know on Monday as well.

## 2018-05-12 ENCOUNTER — PATIENT MESSAGE (OUTPATIENT)
Dept: INTERNAL MEDICINE | Facility: CLINIC | Age: 73
End: 2018-05-12

## 2018-05-14 ENCOUNTER — OFFICE VISIT (OUTPATIENT)
Dept: INTERNAL MEDICINE | Facility: CLINIC | Age: 73
End: 2018-05-14
Payer: COMMERCIAL

## 2018-05-14 VITALS
DIASTOLIC BLOOD PRESSURE: 80 MMHG | SYSTOLIC BLOOD PRESSURE: 138 MMHG | BODY MASS INDEX: 18.06 KG/M2 | TEMPERATURE: 98 F | HEART RATE: 72 BPM | WEIGHT: 115.06 LBS | HEIGHT: 67 IN

## 2018-05-14 DIAGNOSIS — S50.319A ABRASION, ELBOW W/O INFECTION: Primary | ICD-10-CM

## 2018-05-14 DIAGNOSIS — E55.9 VITAMIN D DEFICIENCY DISEASE: ICD-10-CM

## 2018-05-14 DIAGNOSIS — E53.8 VITAMIN B12 DEFICIENCY DISEASE: ICD-10-CM

## 2018-05-14 DIAGNOSIS — D50.9 IRON DEFICIENCY ANEMIA, UNSPECIFIED IRON DEFICIENCY ANEMIA TYPE: ICD-10-CM

## 2018-05-14 PROCEDURE — 99999 PR PBB SHADOW E&M-EST. PATIENT-LVL III: CPT | Mod: PBBFAC,,, | Performed by: FAMILY MEDICINE

## 2018-05-14 PROCEDURE — 99214 OFFICE O/P EST MOD 30 MIN: CPT | Mod: S$GLB,,, | Performed by: FAMILY MEDICINE

## 2018-05-14 NOTE — PROGRESS NOTES
Subjective:      Patient ID: Rosio Solis is a 72 y.o. female.    Chief Complaint: Arm Pain (r)    Disclaimer:  This note is prepared using voice recognition software and as such is likely to have errors and has not been proof read. Please contact me for questions.     Rosio Solis is a 72 y.o. female who presents today for followup of arm lesion. She has still been doing home wound care of daily cleaning with soap and water. bactroban to area, nonadhesive pad and then dressing. It is improving, but has some redundant tissue that she would like to have looked at. It is mildly tender at the elbow, but otherwise doing much better. No fever or chills. No new rashes.     Is needing to schedule labs for b12, and anemias. Doing shots. No problems. + bruising easily.     Last cholesterol was extremely high in HDL. For this reason, not willing to do meds, so no need to recheck for 5 yrs.         Lab Results   Component Value Date    WBC 4.12 03/06/2018    HGB 11.9 (L) 03/06/2018    HCT 38.2 03/06/2018     03/06/2018    CHOL 258 (H) 10/26/2016    TRIG 78 10/26/2016    HDL >140 (H) 10/26/2016    ALT 8 (L) 03/06/2018    AST 15 03/06/2018     03/06/2018    K 4.3 03/06/2018     03/06/2018    CREATININE 0.7 03/06/2018    BUN 12 03/06/2018    CO2 28 03/06/2018    TSH 1.087 10/26/2016    INR 1.0 06/19/2014       Review of Systems   Constitutional: Negative for activity change and unexpected weight change.   HENT: Negative for hearing loss, rhinorrhea and trouble swallowing.    Eyes: Negative for discharge and visual disturbance.   Respiratory: Negative for chest tightness and wheezing.    Cardiovascular: Negative for chest pain and palpitations.   Gastrointestinal: Negative for blood in stool, constipation, diarrhea and vomiting.   Endocrine: Negative for polydipsia.   Genitourinary: Negative for difficulty urinating, dysuria, hematuria and menstrual problem.   Musculoskeletal: Negative for arthralgias, joint  "swelling and neck pain.   Neurological: Negative for weakness and headaches.   Psychiatric/Behavioral: Negative for confusion and dysphoric mood.     Objective:     Vitals:    05/14/18 1141   BP: 138/80   Pulse: 72   Temp: 97.8 °F (36.6 °C)   Weight: 52.2 kg (115 lb 1.3 oz)   Height: 5' 7" (1.702 m)     Physical Exam   Constitutional: She appears well-developed and well-nourished. No distress.   Skin: Skin is warm and dry. Abrasion and lesion noted. No rash noted. There is erythema.        Vitals reviewed.    Assessment:     1. Abrasion, elbow w/o infection    2. Iron deficiency anemia, unspecified iron deficiency anemia type    3. Vitamin B12 deficiency disease    4. Vitamin D deficiency disease      Plan:   Rosio was seen today for arm pain.    Diagnoses and all orders for this visit:    Abrasion, elbow w/o infection  Comments:  improving, removed dead skin. cont with bactroban, can use myderma or eucerin to reduce scarring. cont with covering until fully healed.     Iron deficiency anemia, unspecified iron deficiency anemia type  Comments:  labs prior to next visit to be scheduled. on supplements.   Orders:  -     CBC auto differential; Future  -     Iron and TIBC; Future  -     Vitamin D; Future  -     Vitamin B12; Future    Vitamin B12 deficiency disease  Comments:  labs prior to next visit to be scheduled. on supplements of injections  Orders:  -     CBC auto differential; Future  -     Iron and TIBC; Future  -     Vitamin D; Future  -     Vitamin B12; Future    Vitamin D deficiency disease  Comments:  labs prior to next visit to be scheduled. on supplements.   Orders:  -     CBC auto differential; Future  -     Iron and TIBC; Future  -     Vitamin D; Future  -     Vitamin B12; Future        Time spent: 25 minutes in face to face discussion concerning diagnosis, prognosis, review of lab and test results, benefits of treatment as well as management of disease, counseling of patient and coordination of care " between various health care providers . Greater than half the time spent was used for coordination of care and counseling of patient.       Follow-up if symptoms worsen or fail to improve.

## 2018-06-07 ENCOUNTER — PATIENT MESSAGE (OUTPATIENT)
Dept: INTERNAL MEDICINE | Facility: CLINIC | Age: 73
End: 2018-06-07

## 2018-06-08 ENCOUNTER — CLINICAL SUPPORT (OUTPATIENT)
Dept: INTERNAL MEDICINE | Facility: CLINIC | Age: 73
End: 2018-06-08
Payer: COMMERCIAL

## 2018-06-08 PROCEDURE — 99999 PR PBB SHADOW E&M-EST. PATIENT-LVL II: CPT | Mod: PBBFAC,,,

## 2018-06-08 PROCEDURE — 96372 THER/PROPH/DIAG INJ SC/IM: CPT | Mod: S$GLB,,, | Performed by: FAMILY MEDICINE

## 2018-06-08 RX ADMIN — CYANOCOBALAMIN 1000 MCG: 1000 INJECTION, SOLUTION INTRAMUSCULAR; SUBCUTANEOUS at 11:06

## 2018-07-09 ENCOUNTER — CLINICAL SUPPORT (OUTPATIENT)
Dept: INTERNAL MEDICINE | Facility: CLINIC | Age: 73
End: 2018-07-09
Payer: COMMERCIAL

## 2018-07-09 PROCEDURE — 96372 THER/PROPH/DIAG INJ SC/IM: CPT | Mod: S$GLB,,, | Performed by: FAMILY MEDICINE

## 2018-07-09 PROCEDURE — 99999 PR PBB SHADOW E&M-EST. PATIENT-LVL II: CPT | Mod: PBBFAC,,,

## 2018-07-09 RX ADMIN — CYANOCOBALAMIN 1000 MCG: 1000 INJECTION, SOLUTION INTRAMUSCULAR; SUBCUTANEOUS at 10:07

## 2018-07-11 ENCOUNTER — PATIENT MESSAGE (OUTPATIENT)
Dept: INTERNAL MEDICINE | Facility: CLINIC | Age: 73
End: 2018-07-11

## 2018-07-12 ENCOUNTER — PATIENT MESSAGE (OUTPATIENT)
Dept: INTERNAL MEDICINE | Facility: CLINIC | Age: 73
End: 2018-07-12

## 2018-08-10 ENCOUNTER — CLINICAL SUPPORT (OUTPATIENT)
Dept: INTERNAL MEDICINE | Facility: CLINIC | Age: 73
End: 2018-08-10
Payer: COMMERCIAL

## 2018-08-10 PROCEDURE — 96372 THER/PROPH/DIAG INJ SC/IM: CPT | Mod: S$GLB,,, | Performed by: FAMILY MEDICINE

## 2018-08-10 PROCEDURE — 99999 PR PBB SHADOW E&M-EST. PATIENT-LVL II: CPT | Mod: PBBFAC,,,

## 2018-08-10 RX ADMIN — CYANOCOBALAMIN 1000 MCG: 1000 INJECTION, SOLUTION INTRAMUSCULAR; SUBCUTANEOUS at 09:08

## 2018-08-13 NOTE — PROGRESS NOTES
Pt reported to clinic for B12 injection on 8/10/18. Identified pt using two pt identifiers. Allergies and medications reviewed with pt. B12 injection given. Pt tolerated well and was advised of wait time.

## 2018-08-31 ENCOUNTER — PATIENT OUTREACH (OUTPATIENT)
Dept: INTERNAL MEDICINE | Facility: CLINIC | Age: 73
End: 2018-08-31

## 2018-09-05 ENCOUNTER — LAB VISIT (OUTPATIENT)
Dept: LAB | Facility: HOSPITAL | Age: 73
End: 2018-09-05
Attending: FAMILY MEDICINE
Payer: COMMERCIAL

## 2018-09-05 DIAGNOSIS — D50.9 IRON DEFICIENCY ANEMIA, UNSPECIFIED IRON DEFICIENCY ANEMIA TYPE: ICD-10-CM

## 2018-09-05 DIAGNOSIS — E55.9 VITAMIN D DEFICIENCY DISEASE: ICD-10-CM

## 2018-09-05 DIAGNOSIS — E53.8 VITAMIN B12 DEFICIENCY DISEASE: ICD-10-CM

## 2018-09-05 LAB
25(OH)D3+25(OH)D2 SERPL-MCNC: 51 NG/ML
BASOPHILS # BLD AUTO: 0.06 K/UL
BASOPHILS NFR BLD: 1.3 %
DIFFERENTIAL METHOD: ABNORMAL
EOSINOPHIL # BLD AUTO: 0.1 K/UL
EOSINOPHIL NFR BLD: 1.5 %
ERYTHROCYTE [DISTWIDTH] IN BLOOD BY AUTOMATED COUNT: 13.4 %
HCT VFR BLD AUTO: 38.9 %
HGB BLD-MCNC: 12.1 G/DL
IMM GRANULOCYTES # BLD AUTO: 0.01 K/UL
IMM GRANULOCYTES NFR BLD AUTO: 0.2 %
IRON SERPL-MCNC: 109 UG/DL
LYMPHOCYTES # BLD AUTO: 1.5 K/UL
LYMPHOCYTES NFR BLD: 32.4 %
MCH RBC QN AUTO: 31 PG
MCHC RBC AUTO-ENTMCNC: 31.1 G/DL
MCV RBC AUTO: 100 FL
MONOCYTES # BLD AUTO: 0.5 K/UL
MONOCYTES NFR BLD: 9.9 %
NEUTROPHILS # BLD AUTO: 2.6 K/UL
NEUTROPHILS NFR BLD: 54.7 %
NRBC BLD-RTO: 0 /100 WBC
PLATELET # BLD AUTO: 296 K/UL
PMV BLD AUTO: 11.3 FL
RBC # BLD AUTO: 3.9 M/UL
SATURATED IRON: 23 %
TOTAL IRON BINDING CAPACITY: 481 UG/DL
TRANSFERRIN SERPL-MCNC: 325 MG/DL
VIT B12 SERPL-MCNC: 661 PG/ML
WBC # BLD AUTO: 4.75 K/UL

## 2018-09-05 PROCEDURE — 83540 ASSAY OF IRON: CPT

## 2018-09-05 PROCEDURE — 85025 COMPLETE CBC W/AUTO DIFF WBC: CPT

## 2018-09-05 PROCEDURE — 82607 VITAMIN B-12: CPT

## 2018-09-05 PROCEDURE — 82306 VITAMIN D 25 HYDROXY: CPT

## 2018-09-05 PROCEDURE — 36415 COLL VENOUS BLD VENIPUNCTURE: CPT | Mod: PO

## 2018-09-07 ENCOUNTER — CLINICAL SUPPORT (OUTPATIENT)
Dept: INTERNAL MEDICINE | Facility: CLINIC | Age: 73
End: 2018-09-07
Payer: COMMERCIAL

## 2018-09-07 PROCEDURE — 99999 PR PBB SHADOW E&M-EST. PATIENT-LVL II: CPT | Mod: PBBFAC,,,

## 2018-09-07 PROCEDURE — 96372 THER/PROPH/DIAG INJ SC/IM: CPT | Mod: S$GLB,,, | Performed by: FAMILY MEDICINE

## 2018-09-07 RX ADMIN — CYANOCOBALAMIN 1000 MCG: 1000 INJECTION, SOLUTION INTRAMUSCULAR; SUBCUTANEOUS at 10:09

## 2018-09-12 ENCOUNTER — OFFICE VISIT (OUTPATIENT)
Dept: INTERNAL MEDICINE | Facility: CLINIC | Age: 73
End: 2018-09-12
Payer: COMMERCIAL

## 2018-09-12 VITALS
DIASTOLIC BLOOD PRESSURE: 68 MMHG | BODY MASS INDEX: 18.3 KG/M2 | SYSTOLIC BLOOD PRESSURE: 132 MMHG | TEMPERATURE: 97 F | HEART RATE: 62 BPM | HEIGHT: 67 IN | WEIGHT: 116.63 LBS

## 2018-09-12 DIAGNOSIS — E55.9 VITAMIN D DEFICIENCY DISEASE: ICD-10-CM

## 2018-09-12 DIAGNOSIS — E53.8 VITAMIN B12 DEFICIENCY DISEASE: Primary | ICD-10-CM

## 2018-09-12 DIAGNOSIS — F43.29 GRIEF REACTION WITH PROLONGED BEREAVEMENT: ICD-10-CM

## 2018-09-12 DIAGNOSIS — L40.9 PSORIASIS: ICD-10-CM

## 2018-09-12 DIAGNOSIS — E78.5 HYPERLIPIDEMIA, UNSPECIFIED HYPERLIPIDEMIA TYPE: ICD-10-CM

## 2018-09-12 PROCEDURE — 99214 OFFICE O/P EST MOD 30 MIN: CPT | Mod: S$GLB,,, | Performed by: FAMILY MEDICINE

## 2018-09-12 PROCEDURE — 99999 PR PBB SHADOW E&M-EST. PATIENT-LVL III: CPT | Mod: PBBFAC,,, | Performed by: FAMILY MEDICINE

## 2018-09-12 PROCEDURE — 1101F PT FALLS ASSESS-DOCD LE1/YR: CPT | Mod: CPTII,S$GLB,, | Performed by: FAMILY MEDICINE

## 2018-09-12 RX ORDER — CYANOCOBALAMIN (VITAMIN B-12) 1000MCG/15
15 LIQUID (ML) ORAL DAILY
Qty: 240 ML | Refills: 11 | Status: SHIPPED | OUTPATIENT
Start: 2018-09-12 | End: 2018-10-12

## 2018-09-12 NOTE — PROGRESS NOTES
Subjective:      Patient ID: Rosio Solis is a 73 y.o. female.    Chief Complaint: Follow-up (6 mo  chronic issues)    Disclaimer:  This note is prepared using voice recognition software and as such is likely to have errors and has not been proof read. Please contact me for questions.     Rosio Solis is a 72 y.o. female who presents today for followup labs and skin issues.  Has been doing the B12 shots monthly.  Previously had an issue with some significant swelling the last time and it hurt.  Interested in possibly doing liquid B12 if it would be is beneficial.  She is not anemic at this time.  B12 levels are in the 600s.    Still sees Dr. Marcos for call skin lesions including psoriasis.  Is not currently wearing a week anymore.  Is easily bruising.  Arm lesion on the right has healed.    Last cholesterol was extremely high HDL.  Not willing to do meds.  But does want to check her lab work again next time.    Is currently on vitamin-D.  On many vitamins.    Did have an episode recently about 5 weeks ago where she was grieving the loss of her  again.  Spoke to her brother who is a  and was able to be consoled.  Does still have available to her counseling but feels okay now.  Weight is up 1 lb.  It has been stable all year.            Lab Results   Component Value Date    WBC 4.75 09/05/2018    HGB 12.1 09/05/2018    HCT 38.9 09/05/2018     09/05/2018    CHOL 258 (H) 10/26/2016    TRIG 78 10/26/2016    HDL >140 (H) 10/26/2016    ALT 8 (L) 03/06/2018    AST 15 03/06/2018     03/06/2018    K 4.3 03/06/2018     03/06/2018    CREATININE 0.7 03/06/2018    BUN 12 03/06/2018    CO2 28 03/06/2018    TSH 1.087 10/26/2016    INR 1.0 06/19/2014       Review of Systems   Constitutional: Negative for activity change, appetite change, fatigue and unexpected weight change.   Respiratory: Negative for cough and shortness of breath.    Cardiovascular: Negative for chest pain and palpitations.  "  Gastrointestinal: Negative for abdominal distention and abdominal pain.   Skin: Positive for color change. Negative for pallor, rash and wound.   Psychiatric/Behavioral: Negative for behavioral problems, dysphoric mood and sleep disturbance. The patient is nervous/anxious.      Objective:     Vitals:    09/12/18 1316 09/12/18 1338   BP: (!) 140/82 132/68   BP Location: Left arm    Patient Position: Sitting    BP Method: Large (Manual)    Pulse: 62    Temp: 97 °F (36.1 °C)    TempSrc: Tympanic    Weight: 52.9 kg (116 lb 10 oz)    Height: 5' 7" (1.702 m)      Physical Exam   Constitutional: She appears well-developed and well-nourished.   HENT:   Head: Normocephalic and atraumatic.   Right Ear: Tympanic membrane normal.   Left Ear: Tympanic membrane normal.   Mouth/Throat: Oropharynx is clear and moist.   Eyes: Conjunctivae and EOM are normal.   Neck: Normal range of motion. Neck supple.   Cardiovascular: Normal rate and regular rhythm.   Pulmonary/Chest: Effort normal and breath sounds normal.   Psychiatric: She has a normal mood and affect. Her behavior is normal.   Nursing note and vitals reviewed.    Assessment:     1. Vitamin B12 deficiency disease    2. Psoriasis    3. Grief reaction with prolonged bereavement    4. Hyperlipidemia, unspecified hyperlipidemia type    5. Vitamin D deficiency disease      Plan:   Rosio was seen today for follow-up.    Diagnoses and all orders for this visit:    Vitamin B12 deficiency disease-labs reviewed not anemic at this time okay to try liquid B12 1000 mcg daily through local pharmacy.  Follow up with lab work again in 6 months  -     CBC auto differential; Future  -     Comprehensive metabolic panel; Future  -     Lipid panel; Future  -     Vitamin B12; Future  -     Vitamin D; Future    Psoriasis- stable, followed by Dr Marcos  -     CBC auto differential; Future  -     Comprehensive metabolic panel; Future  -     Lipid panel; Future  -     Vitamin B12; Future  -     Vitamin " D; Future    Grief reaction with prolonged bereavement- has good support system discussed counseling discussed very support groups and reactions.  Doing well.  -     CBC auto differential; Future  -     Comprehensive metabolic panel; Future  -     Lipid panel; Future  -     Vitamin B12; Future  -     Vitamin D; Future    Hyperlipidemia, unspecified hyperlipidemia type-would like to do lab work again prior to next visit high levels of HDL not interested in medication  -     CBC auto differential; Future  -     Comprehensive metabolic panel; Future  -     Lipid panel; Future  -     Vitamin B12; Future  -     Vitamin D; Future    Vitamin D deficiency disease-stable at this time continue with supplements repeat labs again next visit  -     CBC auto differential; Future  -     Comprehensive metabolic panel; Future  -     Lipid panel; Future  -     Vitamin B12; Future  -     Vitamin D; Future    Other orders  -     cyanocobalamin, vitamin B-12, 1,000 mcg/15 mL Liqd; Take 15 mLs by mouth once daily.            Follow-up in about 6 months (around 3/12/2019) for physical with Dr SANTOS.

## 2018-09-18 ENCOUNTER — PATIENT MESSAGE (OUTPATIENT)
Dept: INTERNAL MEDICINE | Facility: CLINIC | Age: 73
End: 2018-09-18

## 2018-11-05 ENCOUNTER — PATIENT OUTREACH (OUTPATIENT)
Dept: ADMINISTRATIVE | Facility: HOSPITAL | Age: 73
End: 2018-11-05

## 2018-11-05 DIAGNOSIS — Z12.11 SCREENING FOR MALIGNANT NEOPLASM OF COLON: Primary | ICD-10-CM

## 2018-11-05 NOTE — PROGRESS NOTES
Contacted patient to follow up on overdue fit kit. Patient requested fit kit be mailed to their home.

## 2018-11-26 ENCOUNTER — APPOINTMENT (OUTPATIENT)
Dept: LAB | Facility: HOSPITAL | Age: 73
End: 2018-11-26
Attending: FAMILY MEDICINE
Payer: COMMERCIAL

## 2019-02-19 ENCOUNTER — OFFICE VISIT (OUTPATIENT)
Dept: URGENT CARE | Facility: CLINIC | Age: 74
End: 2019-02-19
Payer: COMMERCIAL

## 2019-02-19 VITALS
HEART RATE: 94 BPM | TEMPERATURE: 98 F | BODY MASS INDEX: 18.09 KG/M2 | DIASTOLIC BLOOD PRESSURE: 80 MMHG | WEIGHT: 115.5 LBS | SYSTOLIC BLOOD PRESSURE: 120 MMHG | OXYGEN SATURATION: 99 %

## 2019-02-19 DIAGNOSIS — R51.9 ACUTE INTRACTABLE HEADACHE, UNSPECIFIED HEADACHE TYPE: Primary | ICD-10-CM

## 2019-02-19 PROCEDURE — S0119 PR ONDANSETRON, ORAL, 4MG: ICD-10-PCS | Mod: S$GLB,,, | Performed by: NURSE PRACTITIONER

## 2019-02-19 PROCEDURE — S0119 ONDANSETRON 4 MG: HCPCS | Mod: S$GLB,,, | Performed by: NURSE PRACTITIONER

## 2019-02-19 PROCEDURE — 1101F PR PT FALLS ASSESS DOC 0-1 FALLS W/OUT INJ PAST YR: ICD-10-PCS | Mod: CPTII,S$GLB,, | Performed by: NURSE PRACTITIONER

## 2019-02-19 PROCEDURE — 1101F PT FALLS ASSESS-DOCD LE1/YR: CPT | Mod: CPTII,S$GLB,, | Performed by: NURSE PRACTITIONER

## 2019-02-19 PROCEDURE — 96372 THER/PROPH/DIAG INJ SC/IM: CPT | Mod: S$GLB,,, | Performed by: NURSE PRACTITIONER

## 2019-02-19 PROCEDURE — 99214 OFFICE O/P EST MOD 30 MIN: CPT | Mod: 25,S$GLB,, | Performed by: NURSE PRACTITIONER

## 2019-02-19 PROCEDURE — 99999 PR PBB SHADOW E&M-EST. PATIENT-LVL IV: CPT | Mod: PBBFAC,,, | Performed by: NURSE PRACTITIONER

## 2019-02-19 PROCEDURE — 99999 PR PBB SHADOW E&M-EST. PATIENT-LVL IV: ICD-10-PCS | Mod: PBBFAC,,, | Performed by: NURSE PRACTITIONER

## 2019-02-19 PROCEDURE — 96372 PR INJECTION,THERAP/PROPH/DIAG2ST, IM OR SUBCUT: ICD-10-PCS | Mod: S$GLB,,, | Performed by: NURSE PRACTITIONER

## 2019-02-19 PROCEDURE — 99214 PR OFFICE/OUTPT VISIT, EST, LEVL IV, 30-39 MIN: ICD-10-PCS | Mod: 25,S$GLB,, | Performed by: NURSE PRACTITIONER

## 2019-02-19 RX ORDER — ONDANSETRON 4 MG/1
4 TABLET, ORALLY DISINTEGRATING ORAL
Status: COMPLETED | OUTPATIENT
Start: 2019-02-19 | End: 2019-02-19

## 2019-02-19 RX ORDER — KETOROLAC TROMETHAMINE 30 MG/ML
30 INJECTION, SOLUTION INTRAMUSCULAR; INTRAVENOUS
Status: COMPLETED | OUTPATIENT
Start: 2019-02-19 | End: 2019-02-19

## 2019-02-19 RX ADMIN — ONDANSETRON 4 MG: 4 TABLET, ORALLY DISINTEGRATING ORAL at 11:02

## 2019-02-19 RX ADMIN — KETOROLAC TROMETHAMINE 30 MG: 30 INJECTION, SOLUTION INTRAMUSCULAR; INTRAVENOUS at 11:02

## 2019-02-19 NOTE — PROGRESS NOTES
"Subjective:      Patient ID: Rosio Solis is a 73 y.o. female.    Chief Complaint: Headache    Ms. Solis presents to Urgent Care today with complaints of headache x 2 days. States that she has a history of ocular migraines. She had her visual aura on Sunday as she has with past ocular migraines. The headache followed shortly after. She does report feeling "off" yesterday. Has been taking Fioricet with little improvement. The pain is in the frontal region and is throbbing in nature. Feels similar to past migraines in quality, but the intensity is worse. Insidious onset. No vomiting. + nausea. No fever, chills, or URI symptoms. Denies recent fall or head trauma.       Review of Systems   Constitutional: Negative for chills and fever.   HENT: Negative.    Eyes: Positive for visual disturbance (has trouble focusing off and on -- has occurred with past ocular migraines). Negative for pain and redness.   Respiratory: Negative.    Cardiovascular: Negative.    Gastrointestinal: Negative.    Genitourinary: Negative.    Musculoskeletal: Negative.    Skin: Negative.    Neurological: Positive for headaches. Negative for weakness and numbness.   Hematological: Negative.    Psychiatric/Behavioral: Negative.        Objective:   /80 (BP Location: Left arm, Patient Position: Sitting, BP Method: Small (Manual))   Pulse 94   Temp 97.7 °F (36.5 °C) (Oral)   Wt 52.4 kg (115 lb 8.3 oz)   SpO2 99%   BMI 18.09 kg/m²   Physical Exam   Constitutional: She is oriented to person, place, and time. She appears well-developed and well-nourished. No distress.   HENT:   Head: Normocephalic and atraumatic.   Mouth/Throat: Oropharynx is clear and moist.   Eyes: EOM are normal. Pupils are equal, round, and reactive to light.   Neck: Normal range of motion. Neck supple.   Cardiovascular: Regular rhythm and normal heart sounds.   Pulmonary/Chest: Effort normal and breath sounds normal. No respiratory distress.   Musculoskeletal: Normal " "range of motion.   Lymphadenopathy:     She has no cervical adenopathy.   Neurological: She is alert and oriented to person, place, and time. She has normal strength. No cranial nerve deficit or sensory deficit. Coordination and gait normal. GCS eye subscore is 4. GCS verbal subscore is 5. GCS motor subscore is 6.   Skin: Skin is warm, dry and intact. She is not diaphoretic.   Nursing note and vitals reviewed.    Assessment:      1. Acute intractable headache, unspecified headache type       Plan:   Acute intractable headache, unspecified headache type  -     ketorolac injection 30 mg  -     ondansetron disintegrating tablet 4 mg    Headache was only slightly improved. Nausea resolved. Ms. Solis reported that her vision was changed -- having trouble focusing on images and still feeling "off" despite mild improvement in the headache.  Advised ER for further workup. She has a friend here with her who will drive her to the hospital. She requests to go to [facility] ER. Report called to Janis at [facility].    "

## 2019-03-06 ENCOUNTER — LAB VISIT (OUTPATIENT)
Dept: LAB | Facility: HOSPITAL | Age: 74
End: 2019-03-06
Attending: FAMILY MEDICINE
Payer: COMMERCIAL

## 2019-03-06 DIAGNOSIS — L40.9 PSORIASIS: ICD-10-CM

## 2019-03-06 DIAGNOSIS — E53.8 VITAMIN B12 DEFICIENCY DISEASE: ICD-10-CM

## 2019-03-06 DIAGNOSIS — E78.5 HYPERLIPIDEMIA, UNSPECIFIED HYPERLIPIDEMIA TYPE: ICD-10-CM

## 2019-03-06 DIAGNOSIS — E55.9 VITAMIN D DEFICIENCY DISEASE: ICD-10-CM

## 2019-03-06 DIAGNOSIS — F43.29 GRIEF REACTION WITH PROLONGED BEREAVEMENT: ICD-10-CM

## 2019-03-06 LAB
25(OH)D3+25(OH)D2 SERPL-MCNC: 47 NG/ML
ALBUMIN SERPL BCP-MCNC: 4.1 G/DL
ALP SERPL-CCNC: 80 U/L
ALT SERPL W/O P-5'-P-CCNC: 11 U/L
ANION GAP SERPL CALC-SCNC: 10 MMOL/L
AST SERPL-CCNC: 17 U/L
BASOPHILS # BLD AUTO: 0.07 K/UL
BASOPHILS NFR BLD: 1.7 %
BILIRUB SERPL-MCNC: 0.4 MG/DL
BUN SERPL-MCNC: 13 MG/DL
CALCIUM SERPL-MCNC: 10.1 MG/DL
CHLORIDE SERPL-SCNC: 99 MMOL/L
CHOLEST SERPL-MCNC: 250 MG/DL
CHOLEST/HDLC SERPL: ABNORMAL {RATIO}
CO2 SERPL-SCNC: 29 MMOL/L
CREAT SERPL-MCNC: 0.8 MG/DL
DIFFERENTIAL METHOD: ABNORMAL
EOSINOPHIL # BLD AUTO: 0.1 K/UL
EOSINOPHIL NFR BLD: 1.5 %
ERYTHROCYTE [DISTWIDTH] IN BLOOD BY AUTOMATED COUNT: 12.9 %
EST. GFR  (AFRICAN AMERICAN): >60 ML/MIN/1.73 M^2
EST. GFR  (NON AFRICAN AMERICAN): >60 ML/MIN/1.73 M^2
GLUCOSE SERPL-MCNC: 88 MG/DL
HCT VFR BLD AUTO: 41.4 %
HDLC SERPL-MCNC: >140 MG/DL
HDLC SERPL: ABNORMAL %
HGB BLD-MCNC: 13.1 G/DL
IMM GRANULOCYTES # BLD AUTO: 0 K/UL
IMM GRANULOCYTES NFR BLD AUTO: 0 %
LDLC SERPL CALC-MCNC: ABNORMAL MG/DL
LYMPHOCYTES # BLD AUTO: 1.5 K/UL
LYMPHOCYTES NFR BLD: 36.7 %
MCH RBC QN AUTO: 31.3 PG
MCHC RBC AUTO-ENTMCNC: 31.6 G/DL
MCV RBC AUTO: 99 FL
MONOCYTES # BLD AUTO: 0.5 K/UL
MONOCYTES NFR BLD: 11.7 %
NEUTROPHILS # BLD AUTO: 2 K/UL
NEUTROPHILS NFR BLD: 48.4 %
NONHDLC SERPL-MCNC: ABNORMAL MG/DL
NRBC BLD-RTO: 0 /100 WBC
PLATELET # BLD AUTO: 286 K/UL
PMV BLD AUTO: 11.7 FL
POTASSIUM SERPL-SCNC: 4.3 MMOL/L
PROT SERPL-MCNC: 7.6 G/DL
RBC # BLD AUTO: 4.19 M/UL
SODIUM SERPL-SCNC: 138 MMOL/L
TRIGL SERPL-MCNC: 92 MG/DL
VIT B12 SERPL-MCNC: 809 PG/ML
WBC # BLD AUTO: 4.11 K/UL

## 2019-03-06 PROCEDURE — 36415 COLL VENOUS BLD VENIPUNCTURE: CPT | Mod: PO

## 2019-03-06 PROCEDURE — 80053 COMPREHEN METABOLIC PANEL: CPT

## 2019-03-06 PROCEDURE — 85025 COMPLETE CBC W/AUTO DIFF WBC: CPT

## 2019-03-06 PROCEDURE — 80061 LIPID PANEL: CPT

## 2019-03-06 PROCEDURE — 82306 VITAMIN D 25 HYDROXY: CPT

## 2019-03-06 PROCEDURE — 82607 VITAMIN B-12: CPT

## 2019-03-13 ENCOUNTER — OFFICE VISIT (OUTPATIENT)
Dept: INTERNAL MEDICINE | Facility: CLINIC | Age: 74
End: 2019-03-13
Payer: COMMERCIAL

## 2019-03-13 VITALS
BODY MASS INDEX: 17.47 KG/M2 | TEMPERATURE: 97 F | SYSTOLIC BLOOD PRESSURE: 138 MMHG | WEIGHT: 111.31 LBS | DIASTOLIC BLOOD PRESSURE: 78 MMHG | HEART RATE: 80 BPM | HEIGHT: 67 IN

## 2019-03-13 DIAGNOSIS — R51.9 CHRONIC NONINTRACTABLE HEADACHE, UNSPECIFIED HEADACHE TYPE: ICD-10-CM

## 2019-03-13 DIAGNOSIS — G89.29 CHRONIC NONINTRACTABLE HEADACHE, UNSPECIFIED HEADACHE TYPE: ICD-10-CM

## 2019-03-13 DIAGNOSIS — Z79.890 HORMONE REPLACEMENT THERAPY: ICD-10-CM

## 2019-03-13 DIAGNOSIS — E55.9 VITAMIN D DEFICIENCY DISEASE: ICD-10-CM

## 2019-03-13 DIAGNOSIS — E53.8 VITAMIN B12 DEFICIENCY DISEASE: Primary | ICD-10-CM

## 2019-03-13 DIAGNOSIS — L40.9 PSORIASIS: ICD-10-CM

## 2019-03-13 PROCEDURE — 99999 PR PBB SHADOW E&M-EST. PATIENT-LVL III: CPT | Mod: PBBFAC,,, | Performed by: FAMILY MEDICINE

## 2019-03-13 PROCEDURE — 99214 OFFICE O/P EST MOD 30 MIN: CPT | Mod: S$GLB,,, | Performed by: FAMILY MEDICINE

## 2019-03-13 PROCEDURE — 99214 PR OFFICE/OUTPT VISIT, EST, LEVL IV, 30-39 MIN: ICD-10-PCS | Mod: S$GLB,,, | Performed by: FAMILY MEDICINE

## 2019-03-13 PROCEDURE — 1101F PR PT FALLS ASSESS DOC 0-1 FALLS W/OUT INJ PAST YR: ICD-10-PCS | Mod: CPTII,S$GLB,, | Performed by: FAMILY MEDICINE

## 2019-03-13 PROCEDURE — 1101F PT FALLS ASSESS-DOCD LE1/YR: CPT | Mod: CPTII,S$GLB,, | Performed by: FAMILY MEDICINE

## 2019-03-13 PROCEDURE — 99999 PR PBB SHADOW E&M-EST. PATIENT-LVL III: ICD-10-PCS | Mod: PBBFAC,,, | Performed by: FAMILY MEDICINE

## 2019-03-13 RX ORDER — BUTALBITAL, ACETAMINOPHEN AND CAFFEINE 50; 325; 40 MG/1; MG/1; MG/1
1 TABLET ORAL EVERY 4 HOURS PRN
Qty: 30 TABLET | Refills: 1 | Status: SHIPPED | OUTPATIENT
Start: 2019-03-13 | End: 2020-07-20 | Stop reason: SDUPTHER

## 2019-03-13 NOTE — PROGRESS NOTES
Subjective:      Patient ID: Rosio Solis is a 73 y.o. female.    Chief Complaint: Annual Exam    Disclaimer:  This note is prepared using voice recognition software and as such is likely to have errors and has not been proof read. Please contact me for questions.     Rosio Solis is a 72 y.o. female who presents today for followup labs and skin issues.  Has been doing the B12 liquid supplementation for the last 6 months.  Prior to that did B12 shots.  B12 levels are now the 800s.  Hemoglobin is now on the 13.  Still bruises easily but doing very well.  She is not anemic at this time.    Vitamin-D levels are also very well controlled at 40.  She is currently taking 2000 IU daily.    Cholesterol levels are extensively elevated due to HDL elevations.  She has over 140 of the HDL.  She declines any chest pain or shortness of breath.    Recently came to Urgent Care due to persistent headache which we thought was a migraine.  She did not tolerate Toradol.  It did not improve with medications.  She ended up doing electrolytes and CT scan at the emergency room.  There was no stroke no masses noted. She ended up getting swab for influenza B which she was positive.  She is treated with Ativan and Tamiflu.  Her symptoms did resolve within 48 hr.  She just thought it was interesting that it was influenza B is the cost.    She is still on hormone replacement therapy.  Needing a refill the Premarin today.  Doing well declines doing further mammograms.    Skin and hair seems to be doing well.  Currently under care of her dermatologist.  Has psoriasis.  sees Dr. Marcos for psoriasis.    Most recently she has been having some discussions with her prior ex-.  She had planning on going to go visit him.  When they  back in their 30s they remained friends.  Was very amicable.  They recently reached out to each other.  He is a good friend of hers.  She just feels slightly guilty about doing it but wanted to have some  additional advice her opinions on it.  She finds that he has a very good friend of hers.  She is told several of her friends.  They have encouraged her to pursue the interaction.    She has lost about 4 lb in the last year.  She was also with influenza.  Previously she was maintaining her weight 115.        Lab Results   Component Value Date    WBC 4.11 03/06/2019    HGB 13.1 03/06/2019    HCT 41.4 03/06/2019     03/06/2019    CHOL 250 (H) 03/06/2019    TRIG 92 03/06/2019    HDL >140 (H) 03/06/2019    ALT 11 03/06/2019    AST 17 03/06/2019     03/06/2019    K 4.3 03/06/2019    CL 99 03/06/2019    CREATININE 0.8 03/06/2019    BUN 13 03/06/2019    CO2 29 03/06/2019    TSH 1.087 10/26/2016    INR 1.0 06/19/2014       DXA Bone Density Spine And Hip  Narrative: EXAMINATION:  DEXA BONE DENSITY SPINE HIP    CLINICAL HISTORY:  Encounter for screening for osteoporosis    TECHNIQUE:  DXA scanning was performed over the left hip and lumbar spine.  Review of the images confirms satisfactory positioning and technique.    COMPARISON:  None    FINDINGS:  The L1  to L4 vertebral bone mineral density is equal to 1.482 2.5 g/cm squared with a T score of .    The left femoral neck bone mineral density is equal to 1.075 g/cm squared with a T score of 0.3.    The total hip bone mineral density is equal to 1.075 g/cm squared with a T score of 0.5.  Impression: No evidence of significant bone density loss    Consider FDA approved medical therapies in postmenopausal women and men aged 50 years and older, based on the following:    *A hip or vertebral (clinical or morphometric) fracture  *T score less than or equal to -2.5 at the femoral neck or spine after appropriate evaluation to exclude secondary causes.  *Low bone mass -- also known as osteopenia (T score between -1.0 and -2.5 at the femoral neck or spine) and a 10 year probability of hip fracture greater than or equal to 3% or a 10 year probability of major  "osteoporosis-related fracture greater than or equal to 20% based on the [place]-adapted WHO algorithm.  *Clinician's judgment and/or patient preference may indicate treatment for people with 10 year fracture probabilities is above or below these levels.    Electronically signed by: DELIA Edgar MD  Date:    03/26/2018  Time:    17:11        Review of Systems   Constitutional: Negative for activity change, chills, fatigue, fever and unexpected weight change.   HENT: Negative for ear pain, hearing loss, rhinorrhea and trouble swallowing.    Eyes: Negative for pain, discharge and visual disturbance.   Respiratory: Negative for cough, chest tightness, shortness of breath and wheezing.    Cardiovascular: Negative for chest pain, palpitations and leg swelling.   Gastrointestinal: Negative for abdominal pain, blood in stool, constipation, diarrhea, nausea and vomiting.   Endocrine: Negative for cold intolerance, heat intolerance, polydipsia and polyuria.   Genitourinary: Negative for difficulty urinating, dysuria, frequency, hematuria and menstrual problem.   Musculoskeletal: Negative for arthralgias, joint swelling, myalgias and neck pain.   Skin: Positive for color change. Negative for rash.   Neurological: Negative for dizziness, weakness and headaches.   Psychiatric/Behavioral: Negative for behavioral problems, confusion, dysphoric mood and sleep disturbance. The patient is nervous/anxious.      Objective:     Vitals:    03/13/19 1108   BP: 138/78   Pulse: 80   Temp: 97 °F (36.1 °C)   Weight: 50.5 kg (111 lb 5.3 oz)   Height: 5' 7" (1.702 m)     Physical Exam   Constitutional: She is oriented to person, place, and time. She appears well-developed and well-nourished.   HENT:   Head: Normocephalic and atraumatic.   Right Ear: External ear normal.   Left Ear: External ear normal.   Mouth/Throat: Oropharynx is clear and moist.   Eyes: EOM are normal.   Neck: Normal range of motion. Neck supple. No thyromegaly present. "   Cardiovascular: Normal rate and regular rhythm. Exam reveals no gallop and no friction rub.   No murmur heard.  Pulmonary/Chest: Effort normal. No respiratory distress. She has no wheezes. She has no rales.   Abdominal: Soft. Bowel sounds are normal. She exhibits no distension. There is no tenderness. There is no rebound.   Musculoskeletal: Normal range of motion. She exhibits no edema.   Lymphadenopathy:     She has no cervical adenopathy.   Neurological: She is alert and oriented to person, place, and time.   Skin: Skin is warm and dry. Bruising noted. No rash noted.        Psychiatric: She has a normal mood and affect. Her behavior is normal. Judgment and thought content normal.   Vitals reviewed.    Assessment:     1. Vitamin B12 deficiency disease    2. Chronic nonintractable headache, unspecified headache type    3. Psoriasis    4. Vitamin D deficiency disease    5. Hormone replacement therapy      Plan:   Rosio was seen today for annual exam.    Diagnoses and all orders for this visit:    Vitamin B12 deficiency disease-stable labs reviewed  Comments:  cont with b12 liquid    Chronic nonintractable headache, unspecified headache type  Comments:  had flu B, after ed evaluation, refilled Fioricet    Psoriasis-followed by Dermatology currently stable    Vitamin D deficiency disease-improved continue with current supplementation on 2000 IU daily labs reviewed    Hormone replacement therapy  Comments:  refilled meds. on premarin, declines mammogram    Other orders  -     estrogens, conjugated, (PREMARIN) 1.25 MG tablet; Take 1 tablet (1.25 mg total) by mouth once daily.  -     butalbital-acetaminophen-caffeine -40 mg (FIORICET, ESGIC) -40 mg per tablet; Take 1 tablet by mouth every 4 (four) hours as needed for Pain or Headaches.    -at this time encourage patient pursue do whatever type of relationship she would like.  I encouraged her to restart her friends and family members.  She is very active and  very healthy at this time based on blood work and ongoing symptoms.  She will follow back up in 1 year sooner if necessary.        Follow-up in about 1 year (around 3/13/2020) for physical with Dr SANTOS.    There are no Patient Instructions on file for this visit.

## 2019-03-29 ENCOUNTER — PATIENT MESSAGE (OUTPATIENT)
Dept: INTERNAL MEDICINE | Facility: CLINIC | Age: 74
End: 2019-03-29

## 2019-04-01 RX ORDER — ESTRADIOL 0.1 MG/G
1 CREAM VAGINAL
Qty: 42.5 G | Refills: 3 | Status: SHIPPED | OUTPATIENT
Start: 2019-04-01 | End: 2020-03-31

## 2019-04-01 NOTE — TELEPHONE ENCOUNTER
Called patient to discuss her concerns.  She has started with a new partner in as having intercourse however was very painful.  She is already on Premarin oral tablets.  She states it is painful on initial penetration as well as her partner is very large. Advised to start vaginal and topical estrogen 2 times per week. Try various positions with female on top, with use of lubricants also. If not improving, then can refer to gyn to discuss vaginal dilators, and next steps. Pt was appreciative of phone call and medication was sent in.

## 2019-04-19 ENCOUNTER — PATIENT MESSAGE (OUTPATIENT)
Dept: INTERNAL MEDICINE | Facility: CLINIC | Age: 74
End: 2019-04-19

## 2019-04-19 DIAGNOSIS — N94.10 DYSPAREUNIA IN FEMALE: Primary | ICD-10-CM

## 2019-04-25 ENCOUNTER — PATIENT MESSAGE (OUTPATIENT)
Dept: INTERNAL MEDICINE | Facility: CLINIC | Age: 74
End: 2019-04-25

## 2019-05-07 ENCOUNTER — TELEPHONE (OUTPATIENT)
Dept: OBSTETRICS AND GYNECOLOGY | Facility: CLINIC | Age: 74
End: 2019-05-07

## 2019-05-07 NOTE — TELEPHONE ENCOUNTER
----- Message from Consuelo Ulloa sent at 5/7/2019 11:14 AM CDT -----  needs to est care with tai....[phone]

## 2019-05-07 NOTE — TELEPHONE ENCOUNTER
Returned call to patient.  She says that she wants to get care established, but was referred to two providers by her pcp, Dr. Tijerina and Dr. Sewell were the providers names given to her, per patient.  Made patient aware of multiple appointments available with both providers at [facility] and [facility].  She verbalized understanding and stated that she is due to have a procedure and is completely booked up for May, per patient.  Says that she will possibly need an appt in June 2019.  She wrote down available appointments for both providers, and said that she will call back once she reviews her schedule and select an appropriate appointment to establish care, per patient.

## 2019-06-05 ENCOUNTER — OFFICE VISIT (OUTPATIENT)
Dept: OBSTETRICS AND GYNECOLOGY | Facility: CLINIC | Age: 74
End: 2019-06-05
Payer: COMMERCIAL

## 2019-06-05 VITALS — SYSTOLIC BLOOD PRESSURE: 124 MMHG | WEIGHT: 115.5 LBS | DIASTOLIC BLOOD PRESSURE: 82 MMHG | BODY MASS INDEX: 18.09 KG/M2

## 2019-06-05 DIAGNOSIS — Z01.419 WELL WOMAN EXAM WITH ROUTINE GYNECOLOGICAL EXAM: Primary | ICD-10-CM

## 2019-06-05 PROCEDURE — 99999 PR PBB SHADOW E&M-EST. PATIENT-LVL II: CPT | Mod: PBBFAC,,, | Performed by: OBSTETRICS & GYNECOLOGY

## 2019-06-05 PROCEDURE — 99387 PR PREVENTIVE VISIT,NEW,65 & OVER: ICD-10-PCS | Mod: S$GLB,,, | Performed by: OBSTETRICS & GYNECOLOGY

## 2019-06-05 PROCEDURE — 99387 INIT PM E/M NEW PAT 65+ YRS: CPT | Mod: S$GLB,,, | Performed by: OBSTETRICS & GYNECOLOGY

## 2019-06-05 PROCEDURE — 99999 PR PBB SHADOW E&M-EST. PATIENT-LVL II: ICD-10-PCS | Mod: PBBFAC,,, | Performed by: OBSTETRICS & GYNECOLOGY

## 2019-06-10 ENCOUNTER — HOSPITAL ENCOUNTER (OUTPATIENT)
Dept: RADIOLOGY | Facility: HOSPITAL | Age: 74
Discharge: HOME | End: 2019-06-10
Attending: OBSTETRICS & GYNECOLOGY
Payer: COMMERCIAL

## 2019-06-10 DIAGNOSIS — Z01.419 WELL WOMAN EXAM WITH ROUTINE GYNECOLOGICAL EXAM: ICD-10-CM

## 2019-06-10 PROCEDURE — 77067 SCR MAMMO BI INCL CAD: CPT | Mod: 26,,, | Performed by: RADIOLOGY

## 2019-06-10 PROCEDURE — 77067 SCR MAMMO BI INCL CAD: CPT | Mod: TC

## 2019-06-10 PROCEDURE — 77063 BREAST TOMOSYNTHESIS BI: CPT | Mod: 26,,, | Performed by: RADIOLOGY

## 2019-06-10 PROCEDURE — 77067 MAMMO DIGITAL SCREENING BILAT WITH TOMOSYNTHESIS_CAD: ICD-10-PCS | Mod: 26,,, | Performed by: RADIOLOGY

## 2019-06-10 PROCEDURE — 77063 MAMMO DIGITAL SCREENING BILAT WITH TOMOSYNTHESIS_CAD: ICD-10-PCS | Mod: 26,,, | Performed by: RADIOLOGY

## 2019-08-07 ENCOUNTER — OFFICE VISIT (OUTPATIENT)
Dept: OBSTETRICS AND GYNECOLOGY | Facility: CLINIC | Age: 74
End: 2019-08-07
Payer: COMMERCIAL

## 2019-08-07 VITALS — WEIGHT: 119.25 LBS | BODY MASS INDEX: 18.72 KG/M2 | HEIGHT: 67 IN

## 2019-08-07 DIAGNOSIS — N90.5 ATROPHIC VULVA: Primary | ICD-10-CM

## 2019-08-07 PROCEDURE — 99999 PR PBB SHADOW E&M-EST. PATIENT-LVL II: CPT | Mod: PBBFAC,,, | Performed by: OBSTETRICS & GYNECOLOGY

## 2019-08-07 PROCEDURE — 99213 PR OFFICE/OUTPT VISIT, EST, LEVL III, 20-29 MIN: ICD-10-PCS | Mod: S$GLB,,, | Performed by: OBSTETRICS & GYNECOLOGY

## 2019-08-07 PROCEDURE — 99213 OFFICE O/P EST LOW 20 MIN: CPT | Mod: S$GLB,,, | Performed by: OBSTETRICS & GYNECOLOGY

## 2019-08-07 PROCEDURE — 1101F PR PT FALLS ASSESS DOC 0-1 FALLS W/OUT INJ PAST YR: ICD-10-PCS | Mod: CPTII,S$GLB,, | Performed by: OBSTETRICS & GYNECOLOGY

## 2019-08-07 PROCEDURE — 1101F PT FALLS ASSESS-DOCD LE1/YR: CPT | Mod: CPTII,S$GLB,, | Performed by: OBSTETRICS & GYNECOLOGY

## 2019-08-07 PROCEDURE — 99999 PR PBB SHADOW E&M-EST. PATIENT-LVL II: ICD-10-PCS | Mod: PBBFAC,,, | Performed by: OBSTETRICS & GYNECOLOGY

## 2019-08-07 NOTE — PROGRESS NOTES
CHIEF COMPLAINT:   Chief Complaint   Patient presents with    Follow-up       HISTORY OF PRESENT ILLNESS    Rosio Solis 73 y.o. G0 here for f/u of her estrace cream, and sexual dysfunction due to dryness. Improved with lubricant in the past, but much more improved w estrace - used it about 2x/wk.       HISTORY  Patient Active Problem List   Diagnosis    Rash and nonspecific skin eruption    Dry eyes    Grief reaction with prolonged bereavement    Vitamin D deficiency disease    Vitamin B12 deficiency disease    Psoriasis    Chronic nonintractable headache    Hormone replacement therapy       Past Medical History:   Diagnosis Date    Grief reaction with prolonged bereavement 11/30/2016    Vitamin B12 deficiency disease 11/30/2016    completed weekly shots, moving to monthly now.     Vitamin D deficiency disease 11/30/2016    on 2000 IU daily now.        Past Surgical History:   Procedure Laterality Date    HYSTERECTOMY      endometriosis       Family History   Problem Relation Age of Onset    Heart disease Mother     Heart disease Father        Social History     Socioeconomic History    Marital status:      Spouse name: elver     Number of children: Not on file    Years of education: Not on file    Highest education level: Not on file   Occupational History    Occupation: retired    Social Needs    Financial resource strain: Not on file    Food insecurity:     Worry: Not on file     Inability: Not on file    Transportation needs:     Medical: Not on file     Non-medical: Not on file   Tobacco Use    Smoking status: Never Smoker    Smokeless tobacco: Never Used   Substance and Sexual Activity    Alcohol use: Yes     Alcohol/week: 0.6 oz     Types: 1 Glasses of wine per week    Drug use: Never    Sexual activity: Yes     Partners: Male     Birth control/protection: None   Lifestyle    Physical activity:     Days per week: Not on file     Minutes per session: Not on file     Stress: Not on file   Relationships    Social connections:     Talks on phone: Not on file     Gets together: Not on file     Attends [employer] service: Not on file     Active member of club or organization: Not on file     Attends meetings of clubs or organizations: Not on file     Relationship status: Not on file   Other Topics Concern    Patient feels they ought to cut down on drinking/drug use Not Asked    Patient annoyed by others criticizing their drinking/drug use Not Asked    Patient has felt bad or guilty about drinking/drug use Not Asked    Patient has had a drink/used drugs as an eye opener in the AM Not Asked   Social History Narrative    Not on file       Current Outpatient Medications   Medication Sig Dispense Refill    biotin 1,000 mcg Chew Take 5,000 mcg by mouth once daily. 100 tablet 0    butalbital-acetaminophen-caffeine -40 mg (FIORICET, ESGIC) -40 mg per tablet Take 1 tablet by mouth every 4 (four) hours as needed for Pain or Headaches. 30 tablet 1    cholecalciferol, vitamin D3, (VITAMIN D3) 2,000 unit Cap Take 1 capsule (2,000 Units total) by mouth once daily. 100 capsule 3    cycloSPORINE (RESTASIS) 0.05 % ophthalmic emulsion 1 drop 2 (two) times daily.      estradiol (ESTRACE) 0.01 % (0.1 mg/gram) vaginal cream Place 1 g vaginally twice a week. 42.5 g 3    estrogens, conjugated, (PREMARIN) 1.25 MG tablet Take 1 tablet (1.25 mg total) by mouth once daily. 30 tablet 11    mupirocin (BACTROBAN) 2 % ointment Apply topically 3 (three) times daily. 30 g 1    vit C-vit E-copper-zinc-lutein (PRESERVISION LUTEIN) 226 mg-200 unit -5 mg-0.8 mg Cap 1 tablet po daily       No current facility-administered medications for this visit.        Review of patient's allergies indicates:   Allergen Reactions    Sulfa (sulfonamide antibiotics) Nausea And Vomiting     As a child           PHYSICAL EXAM     PAIN SCALE: 0/10 None    ROS:  GENERAL: No fever, chills, fatigability or weight  loss.  ABDOMEN: Appetite fine. No weight loss. Denies diarrhea, abdominal pain, hematemesis or blood in stool.  URINARY: No flank pain, dysuria or hematuria.  REPRODUCTIVE: No abnormal vaginal bleeding.   BREASTS: Breasts symmetric, nontender and no lumps detected.    PE:   APPEARANCE: Well nourished, well developed, in no acute distress.  ABDOMEN: Soft. No tenderness or masses. No hepatosplenomegaly. No hernias.  PELVIC:   VULVA: No lesions. Dry atrophic female genitalia.  URETHRAL MEATUS: Normal size and location, no lesions, no prolapse.  URETHRA: No masses, tenderness, prolapse or scarring.  VAGINA: narrow but less dry, no discharge, no significant cystocele or rectocele.    DIAGNOSIS:   1. Vulvovaginal atrophy improved      PLAN:          MEDICATIONS PRESCRIBED:   Continue estrace cream  use    COUNSELING:  Pt happy w results so far. Desires to continue. May use nightly.   F/u annual.

## 2019-08-29 ENCOUNTER — PATIENT MESSAGE (OUTPATIENT)
Dept: INTERNAL MEDICINE | Facility: CLINIC | Age: 74
End: 2019-08-29

## 2019-09-03 ENCOUNTER — PATIENT MESSAGE (OUTPATIENT)
Dept: INTERNAL MEDICINE | Facility: CLINIC | Age: 74
End: 2019-09-03

## 2019-09-10 ENCOUNTER — OFFICE VISIT (OUTPATIENT)
Dept: INTERNAL MEDICINE | Facility: CLINIC | Age: 74
End: 2019-09-10
Payer: COMMERCIAL

## 2019-09-10 VITALS
DIASTOLIC BLOOD PRESSURE: 74 MMHG | TEMPERATURE: 98 F | SYSTOLIC BLOOD PRESSURE: 122 MMHG | HEART RATE: 72 BPM | BODY MASS INDEX: 18.3 KG/M2 | WEIGHT: 116.63 LBS | HEIGHT: 67 IN

## 2019-09-10 DIAGNOSIS — F41.8 SITUATIONAL ANXIETY: ICD-10-CM

## 2019-09-10 DIAGNOSIS — R60.9 EDEMA, UNSPECIFIED TYPE: ICD-10-CM

## 2019-09-10 DIAGNOSIS — Z71.84 TRAVEL ADVICE ENCOUNTER: ICD-10-CM

## 2019-09-10 DIAGNOSIS — R03.0 ELEVATED BLOOD PRESSURE READING: ICD-10-CM

## 2019-09-10 DIAGNOSIS — Z00.00 ROUTINE GENERAL MEDICAL EXAMINATION AT A HEALTH CARE FACILITY: Primary | ICD-10-CM

## 2019-09-10 PROCEDURE — 99999 PR PBB SHADOW E&M-EST. PATIENT-LVL III: ICD-10-PCS | Mod: PBBFAC,,, | Performed by: FAMILY MEDICINE

## 2019-09-10 PROCEDURE — 99397 PER PM REEVAL EST PAT 65+ YR: CPT | Mod: S$GLB,,, | Performed by: FAMILY MEDICINE

## 2019-09-10 PROCEDURE — 99999 PR PBB SHADOW E&M-EST. PATIENT-LVL III: CPT | Mod: PBBFAC,,, | Performed by: FAMILY MEDICINE

## 2019-09-10 PROCEDURE — 99397 PR PREVENTIVE VISIT,EST,65 & OVER: ICD-10-PCS | Mod: S$GLB,,, | Performed by: FAMILY MEDICINE

## 2019-09-10 RX ORDER — ALPRAZOLAM 0.25 MG/1
0.25 TABLET ORAL DAILY PRN
Qty: 10 TABLET | Refills: 0 | Status: SHIPPED | OUTPATIENT
Start: 2019-09-10 | End: 2020-07-20 | Stop reason: SDUPTHER

## 2019-09-10 RX ORDER — HYDROCHLOROTHIAZIDE 12.5 MG/1
12.5 TABLET ORAL DAILY PRN
Qty: 30 TABLET | Refills: 1 | Status: SHIPPED | OUTPATIENT
Start: 2019-09-10 | End: 2020-07-20

## 2019-09-10 RX ORDER — CIPROFLOXACIN 250 MG/1
250 TABLET, FILM COATED ORAL 2 TIMES DAILY
Qty: 10 TABLET | Refills: 0 | Status: SHIPPED | OUTPATIENT
Start: 2019-09-10 | End: 2020-07-20

## 2019-09-13 NOTE — PROGRESS NOTES
Subjective:      Patient ID: Rosio Solis is a 74 y.o. female.    Chief Complaint: Edema (feet) and Annual Exam    Disclaimer:  This note is prepared using voice recognition software and as such is likely to have errors and has not been proof read. Please contact me for questions.     Rosio Solis is a 74 y.o. female who presents today for several concerns and yearly exam.      edema.   Since last visit is going to go on a 21 day trip through [place]. This is her first trip oversees. She has a blood pressure cuff also. She is very thin. No swelling today. Normally doesn't cook with a lot of salt, but has eating more food with salt eating out at times. Only lasts for about 1 day. Was uncertain about cause and what to do to avoid on her trip.     BP has been up a few times on the home BP cuff.  Brought In today. No prior issues with bp. Had a few noted elevated at dentist. Not on meds. Does get anxious.     Did meet with Dr Sewell. Trying to lower oral estrogen dose. Doing topical estrogen. Is helping. Is now sexually active again.      She declines any chest pain or shortness of breath.    Declines mammo        Lab Results   Component Value Date    WBC 4.11 03/06/2019    HGB 13.1 03/06/2019    HCT 41.4 03/06/2019     03/06/2019    CHOL 250 (H) 03/06/2019    TRIG 92 03/06/2019    HDL >140 (H) 03/06/2019    ALT 11 03/06/2019    AST 17 03/06/2019     03/06/2019    K 4.3 03/06/2019    CL 99 03/06/2019    CREATININE 0.8 03/06/2019    BUN 13 03/06/2019    CO2 29 03/06/2019    TSH 1.087 10/26/2016    INR 1.0 06/19/2014       Mammo Digital Screening Bilat w/ Wilian  Narrative: Result:  Mammo Digital Screening Bilat w/ Wilian    History:  Patient is 73 y.o. and is seen for a screening mammogram.    Films Compared:  Prior images (if available) were compared.    Findings:  Computer-aided detection was utilized in the interpretation of this   examination. This procedure was performed using tomosynthesis.       The  "breasts are heterogeneously dense, which may obscure small masses.   There is no evidence of suspicious masses, microcalcifications or   architectural distortion.  Impression: No mammographic evidence of malignancy.    BI-RADS Category 1: Negative    Recommendation:  Routine screening mammogram in 1 year is recommended.    The patient's estimated lifetime risk of breast cancer (to age 85) based   on Tyrer-Cuzick - 7 risk assessment model is: Tyrer-Cuzick: 5.03 %.   According to the American Cancer Society,  patients with a lifetime breast   cancer risk of 20% or higher might benefit from supplemental screening   tests.        Review of Systems   Constitutional: Negative for activity change, appetite change, chills, fatigue and fever.   HENT: Negative for ear pain and trouble swallowing.    Eyes: Negative for pain and visual disturbance.   Respiratory: Negative for cough and shortness of breath.    Cardiovascular: Positive for leg swelling. Negative for chest pain and palpitations.   Gastrointestinal: Negative for abdominal pain, blood in stool, nausea and vomiting.   Endocrine: Negative for cold intolerance and heat intolerance.   Genitourinary: Negative for dysuria and frequency.   Musculoskeletal: Negative for joint swelling, myalgias and neck pain.   Skin: Negative for color change and rash.   Neurological: Positive for headaches. Negative for dizziness.   Psychiatric/Behavioral: Negative for behavioral problems, dysphoric mood and sleep disturbance. The patient is nervous/anxious.      Objective:     Vitals:    09/10/19 1415   BP: 122/74   Pulse: 72   Temp: 98.3 °F (36.8 °C)   TempSrc: Oral   Weight: 52.9 kg (116 lb 10 oz)   Height: 5' 7" (1.702 m)     Physical Exam   Constitutional: She is oriented to person, place, and time. She appears well-developed and well-nourished.   HENT:   Head: Normocephalic and atraumatic.   Right Ear: External ear normal.   Left Ear: External ear normal.   Mouth/Throat: Oropharynx " is clear and moist.   Eyes: EOM are normal.   Neck: Normal range of motion. Neck supple. No thyromegaly present.   Cardiovascular: Normal rate and regular rhythm. Exam reveals no gallop and no friction rub.   No murmur heard.  Pulmonary/Chest: Effort normal. No respiratory distress. She has no wheezes. She has no rales.   Abdominal: Soft. Bowel sounds are normal. She exhibits no distension. There is no tenderness. There is no rebound.   Musculoskeletal: Normal range of motion. She exhibits no edema.   Lymphadenopathy:     She has no cervical adenopathy.   Neurological: She is alert and oriented to person, place, and time.   Skin: Skin is warm and dry. No rash noted.   Psychiatric: She has a normal mood and affect. Her behavior is normal. Judgment and thought content normal.   Vitals reviewed.    Assessment:     1. Routine general medical examination at a health care facility    2. Edema, unspecified type    3. Travel advice encounter    4. Situational anxiety    5. Elevated blood pressure reading      Plan:   Rosio was seen today for edema and annual exam.    Diagnoses and all orders for this visit:    Routine general medical examination at a health care facility - labs reviewed Discussed Health Maintenance issues.       Edema, unspecified type- new, suspect related to salt intake and venous stasis. Compression hose while traveling. Decrease salt. Can use hctz if needed.     Travel advice encounter- discussed meds. Desires to have rx for possible treatment for uti. Will send in cipro. telemed visit available     Situational anxiety- ok for low dose xanax for flight. Prescribed today.     Elevated blood pressure reading- noted, normal today. Cont to monitor diet. Has low dose hctz if sbp is > 160.     Other orders  -     ciprofloxacin HCl (CIPRO) 250 MG tablet; Take 1 tablet (250 mg total) by mouth 2 (two) times daily.  -     hydroCHLOROthiazide (HYDRODIURIL) 12.5 MG Tab; Take 1 tablet (12.5 mg total) by mouth daily  as needed (leg swelling, SBP > 160).  -     ALPRAZolam (XANAX) 0.25 MG tablet; Take 1 tablet (0.25 mg total) by mouth daily as needed for Insomnia or Anxiety.            Follow up if symptoms worsen or fail to improve.    Patient Instructions   Compression socks 15-20mm HG at [employer]

## 2019-11-18 ENCOUNTER — TELEPHONE (OUTPATIENT)
Dept: INTERNAL MEDICINE | Facility: CLINIC | Age: 74
End: 2019-11-18

## 2019-11-18 NOTE — TELEPHONE ENCOUNTER
----- Message from Daisy Mujica sent at 11/18/2019  9:59 AM CST -----  Contact: Patient  Type:  Sooner Apoointment Request    Caller is requesting a sooner appointment.  Caller declined first available appointment listed below.  Caller will not accept being placed on the waitlist and is requesting a message be sent to doctor.  Name of Caller:Rosio Solis  When is the first available appointment?2/3/20  Symptoms:pain in left leg  Would the patient rather a call back or a response via [facility]? Call back  Best Call Back Number:[phone]  Additional Information: Patient also needs medication

## 2019-11-19 ENCOUNTER — OFFICE VISIT (OUTPATIENT)
Dept: INTERNAL MEDICINE | Facility: CLINIC | Age: 74
End: 2019-11-19
Payer: COMMERCIAL

## 2019-11-19 VITALS
HEIGHT: 67 IN | TEMPERATURE: 98 F | BODY MASS INDEX: 18.58 KG/M2 | DIASTOLIC BLOOD PRESSURE: 70 MMHG | SYSTOLIC BLOOD PRESSURE: 110 MMHG | HEART RATE: 74 BPM | WEIGHT: 118.38 LBS

## 2019-11-19 DIAGNOSIS — M79.605 LEFT LEG PAIN: Primary | ICD-10-CM

## 2019-11-19 PROCEDURE — 99214 OFFICE O/P EST MOD 30 MIN: CPT | Mod: S$GLB,,, | Performed by: NURSE PRACTITIONER

## 2019-11-19 PROCEDURE — 1159F PR MEDICATION LIST DOCUMENTED IN MEDICAL RECORD: ICD-10-PCS | Mod: S$GLB,,, | Performed by: NURSE PRACTITIONER

## 2019-11-19 PROCEDURE — 1101F PT FALLS ASSESS-DOCD LE1/YR: CPT | Mod: CPTII,S$GLB,, | Performed by: NURSE PRACTITIONER

## 2019-11-19 PROCEDURE — 1125F AMNT PAIN NOTED PAIN PRSNT: CPT | Mod: S$GLB,,, | Performed by: NURSE PRACTITIONER

## 2019-11-19 PROCEDURE — 99999 PR PBB SHADOW E&M-EST. PATIENT-LVL IV: CPT | Mod: PBBFAC,,, | Performed by: NURSE PRACTITIONER

## 2019-11-19 PROCEDURE — 1101F PR PT FALLS ASSESS DOC 0-1 FALLS W/OUT INJ PAST YR: ICD-10-PCS | Mod: CPTII,S$GLB,, | Performed by: NURSE PRACTITIONER

## 2019-11-19 PROCEDURE — 1159F MED LIST DOCD IN RCRD: CPT | Mod: S$GLB,,, | Performed by: NURSE PRACTITIONER

## 2019-11-19 PROCEDURE — 99214 PR OFFICE/OUTPT VISIT, EST, LEVL IV, 30-39 MIN: ICD-10-PCS | Mod: S$GLB,,, | Performed by: NURSE PRACTITIONER

## 2019-11-19 PROCEDURE — 1125F PR PAIN SEVERITY QUANTIFIED, PAIN PRESENT: ICD-10-PCS | Mod: S$GLB,,, | Performed by: NURSE PRACTITIONER

## 2019-11-19 PROCEDURE — 99999 PR PBB SHADOW E&M-EST. PATIENT-LVL IV: ICD-10-PCS | Mod: PBBFAC,,, | Performed by: NURSE PRACTITIONER

## 2019-11-19 RX ORDER — ESTROGENS, CONJUGATED 1.25 MG
TABLET ORAL
Qty: 30 TABLET | Refills: 11 | OUTPATIENT
Start: 2019-11-19

## 2019-11-19 RX ORDER — NAPROXEN 500 MG/1
500 TABLET ORAL 2 TIMES DAILY PRN
Qty: 30 TABLET | Refills: 0 | Status: SHIPPED | OUTPATIENT
Start: 2019-11-19 | End: 2019-11-29

## 2019-11-19 NOTE — PROGRESS NOTES
"Subjective:       Patient ID: Rosio Solis is a 74 y.o. female.    Chief Complaint: Leg Pain (left )    [facility] She also had pain to the ankle area on the left side as well. All pain resolved but came back 2 days ago. No trauma or injury. She does report exercising/waking with a different pair of socks that caused her foot to move around in her shoes more. No swelling. She is going out of town for thanksgiving and wants something to bring with her for as needed pain. She suggests a prescription anti inflammatory.      /70 (BP Location: Left arm, Patient Position: Sitting, BP Method: Large (Manual))   Pulse 74   Temp 97.8 °F (36.6 °C) (Oral)   Ht 5' 7" (1.702 m)   Wt 53.7 kg (118 lb 6.2 oz)   BMI 18.54 kg/m²     Review of Systems   Constitutional: Negative for activity change, appetite change, chills, diaphoresis, fatigue, fever and unexpected weight change.   HENT: Negative.  Negative for hearing loss, rhinorrhea and trouble swallowing.    Eyes: Negative for discharge and visual disturbance.   Respiratory: Negative for cough, chest tightness, shortness of breath and wheezing.    Cardiovascular: Negative for chest pain, palpitations and leg swelling.   Gastrointestinal: Negative.  Negative for blood in stool, constipation, diarrhea and vomiting.   Endocrine: Negative for polydipsia and polyuria.   Genitourinary: Negative.  Negative for difficulty urinating, dysuria, hematuria and menstrual problem.   Musculoskeletal: Positive for arthralgias and gait problem. Negative for joint swelling and neck pain.   Skin: Negative for color change, pallor, rash and wound.   Allergic/Immunologic: Negative for immunocompromised state.   Neurological: Negative for dizziness, facial asymmetry, weakness and headaches.   Hematological: Negative for adenopathy. Does not bruise/bleed easily.   Psychiatric/Behavioral: Negative for agitation, behavioral " problems, confusion and dysphoric mood.       Objective:      Physical Exam   Constitutional: She is oriented to person, place, and time. She appears well-developed and well-nourished. She is cooperative. No distress.   HENT:   Head: Normocephalic and atraumatic.   Eyes: Conjunctivae are normal. Right eye exhibits no discharge. Left eye exhibits no discharge.   Cardiovascular: Normal rate, regular rhythm and normal heart sounds.   No murmur heard.  Pulmonary/Chest: Effort normal and breath sounds normal. No respiratory distress. She has no wheezes. She has no rales. She exhibits no tenderness.   Abdominal: Soft. She exhibits no distension.   Musculoskeletal: Normal range of motion. She exhibits tenderness.        Left ankle: Tenderness.        Left lower leg: She exhibits tenderness. She exhibits no bony tenderness and no swelling.        Legs:  Tenderness per drawing. Not reproducible. No swelling. Full rom   Neurological: She is alert and oriented to person, place, and time.   Skin: Skin is warm and dry. No rash noted. She is not diaphoretic.   Psychiatric: She has a normal mood and affect. Her behavior is normal. Judgment and thought content normal.   Nursing note and vitals reviewed.      Assessment:       1. Left leg pain        Plan:       Rosio was seen today for leg pain.    Diagnoses and all orders for this visit:    Left leg pain  -     naproxen (NAPROSYN) 500 MG tablet; Take 1 tablet (500 mg total) by mouth 2 (two) times daily as needed (pain).    take naproxen with food  Patient Instructions   Naproxen with food twice a day as needed for leg pain  Epsom salt soaks  Heating pad as needed for comfort  Wear good supportive shoes    Follow up if not improving or worse

## 2019-11-19 NOTE — PATIENT INSTRUCTIONS
Naproxen with food twice a day as needed for leg pain  Epsom salt soaks  Heating pad as needed for comfort  Wear good supportive shoes

## 2020-07-06 ENCOUNTER — TELEPHONE (OUTPATIENT)
Dept: PRIMARY CARE CLINIC | Facility: CLINIC | Age: 75
End: 2020-07-06

## 2020-07-06 ENCOUNTER — PATIENT MESSAGE (OUTPATIENT)
Dept: OBSTETRICS AND GYNECOLOGY | Facility: CLINIC | Age: 75
End: 2020-07-06

## 2020-07-06 DIAGNOSIS — Z00.00 WELLNESS EXAMINATION: Primary | ICD-10-CM

## 2020-07-06 NOTE — TELEPHONE ENCOUNTER
----- Message from Ann Marie Muniz sent at 7/6/2020  2:56 PM CDT -----  Regarding: Lab Orders  Pt is scheduled for bloodwork on July 8, but no orders are linked. I wanted to see if orders could be linked to the appt. Thanks.

## 2020-07-07 ENCOUNTER — PATIENT MESSAGE (OUTPATIENT)
Dept: OBSTETRICS AND GYNECOLOGY | Facility: CLINIC | Age: 75
End: 2020-07-07

## 2020-07-08 ENCOUNTER — LAB VISIT (OUTPATIENT)
Dept: LAB | Facility: HOSPITAL | Age: 75
End: 2020-07-08
Attending: FAMILY MEDICINE
Payer: COMMERCIAL

## 2020-07-08 DIAGNOSIS — Z00.00 WELLNESS EXAMINATION: ICD-10-CM

## 2020-07-08 PROCEDURE — 85025 COMPLETE CBC W/AUTO DIFF WBC: CPT

## 2020-07-08 PROCEDURE — 36415 COLL VENOUS BLD VENIPUNCTURE: CPT | Mod: PO

## 2020-07-08 PROCEDURE — 80061 LIPID PANEL: CPT

## 2020-07-08 PROCEDURE — 80053 COMPREHEN METABOLIC PANEL: CPT

## 2020-07-08 PROCEDURE — 82607 VITAMIN B-12: CPT

## 2020-07-09 LAB
ALBUMIN SERPL BCP-MCNC: 4.1 G/DL (ref 3.5–5.2)
ALP SERPL-CCNC: 82 U/L (ref 55–135)
ALT SERPL W/O P-5'-P-CCNC: 15 U/L (ref 10–44)
ANION GAP SERPL CALC-SCNC: 12 MMOL/L (ref 8–16)
AST SERPL-CCNC: 20 U/L (ref 10–40)
BASOPHILS # BLD AUTO: 0.05 K/UL (ref 0–0.2)
BASOPHILS NFR BLD: 0.7 % (ref 0–1.9)
BILIRUB SERPL-MCNC: 0.5 MG/DL (ref 0.1–1)
BUN SERPL-MCNC: 16 MG/DL (ref 8–23)
CALCIUM SERPL-MCNC: 9.8 MG/DL (ref 8.7–10.5)
CHLORIDE SERPL-SCNC: 100 MMOL/L (ref 95–110)
CHOLEST SERPL-MCNC: 288 MG/DL (ref 120–199)
CHOLEST/HDLC SERPL: ABNORMAL {RATIO} (ref 2–5)
CO2 SERPL-SCNC: 27 MMOL/L (ref 23–29)
CREAT SERPL-MCNC: 0.7 MG/DL (ref 0.5–1.4)
DIFFERENTIAL METHOD: ABNORMAL
EOSINOPHIL # BLD AUTO: 0 K/UL (ref 0–0.5)
EOSINOPHIL NFR BLD: 0.3 % (ref 0–8)
ERYTHROCYTE [DISTWIDTH] IN BLOOD BY AUTOMATED COUNT: 13.8 % (ref 11.5–14.5)
EST. GFR  (AFRICAN AMERICAN): >60 ML/MIN/1.73 M^2
EST. GFR  (NON AFRICAN AMERICAN): >60 ML/MIN/1.73 M^2
GLUCOSE SERPL-MCNC: 66 MG/DL (ref 70–110)
HCT VFR BLD AUTO: 42.4 % (ref 37–48.5)
HDLC SERPL-MCNC: >140 MG/DL (ref 40–75)
HDLC SERPL: ABNORMAL % (ref 20–50)
HGB BLD-MCNC: 12.7 G/DL (ref 12–16)
IMM GRANULOCYTES # BLD AUTO: 0.01 K/UL (ref 0–0.04)
IMM GRANULOCYTES NFR BLD AUTO: 0.1 % (ref 0–0.5)
LDLC SERPL CALC-MCNC: ABNORMAL MG/DL (ref 63–159)
LYMPHOCYTES # BLD AUTO: 2.6 K/UL (ref 1–4.8)
LYMPHOCYTES NFR BLD: 34.7 % (ref 18–48)
MCH RBC QN AUTO: 31.1 PG (ref 27–31)
MCHC RBC AUTO-ENTMCNC: 30 G/DL (ref 32–36)
MCV RBC AUTO: 104 FL (ref 82–98)
MONOCYTES # BLD AUTO: 0.7 K/UL (ref 0.3–1)
MONOCYTES NFR BLD: 9.5 % (ref 4–15)
NEUTROPHILS # BLD AUTO: 4.1 K/UL (ref 1.8–7.7)
NEUTROPHILS NFR BLD: 54.7 % (ref 38–73)
NONHDLC SERPL-MCNC: ABNORMAL MG/DL
NRBC BLD-RTO: 0 /100 WBC
PLATELET # BLD AUTO: 337 K/UL (ref 150–350)
PMV BLD AUTO: 11.3 FL (ref 9.2–12.9)
POTASSIUM SERPL-SCNC: 3.7 MMOL/L (ref 3.5–5.1)
PROT SERPL-MCNC: 7.8 G/DL (ref 6–8.4)
RBC # BLD AUTO: 4.09 M/UL (ref 4–5.4)
SODIUM SERPL-SCNC: 139 MMOL/L (ref 136–145)
TRIGL SERPL-MCNC: 69 MG/DL (ref 30–150)
VIT B12 SERPL-MCNC: 525 PG/ML (ref 210–950)
WBC # BLD AUTO: 7.56 K/UL (ref 3.9–12.7)

## 2020-07-17 ENCOUNTER — TELEPHONE (OUTPATIENT)
Dept: PRIMARY CARE CLINIC | Facility: CLINIC | Age: 75
End: 2020-07-17

## 2020-07-17 NOTE — TELEPHONE ENCOUNTER
Called patient to remind her of appt on Monday at the new location patient stated she will be there.

## 2020-07-20 ENCOUNTER — OFFICE VISIT (OUTPATIENT)
Dept: PRIMARY CARE CLINIC | Facility: CLINIC | Age: 75
End: 2020-07-20
Payer: COMMERCIAL

## 2020-07-20 VITALS
SYSTOLIC BLOOD PRESSURE: 120 MMHG | TEMPERATURE: 98 F | OXYGEN SATURATION: 96 % | HEART RATE: 84 BPM | BODY MASS INDEX: 18.59 KG/M2 | WEIGHT: 118.69 LBS | DIASTOLIC BLOOD PRESSURE: 80 MMHG

## 2020-07-20 DIAGNOSIS — Z12.11 COLON CANCER SCREENING: ICD-10-CM

## 2020-07-20 DIAGNOSIS — L40.9 PSORIASIS: ICD-10-CM

## 2020-07-20 DIAGNOSIS — E78.5 HYPERLIPIDEMIA, UNSPECIFIED HYPERLIPIDEMIA TYPE: ICD-10-CM

## 2020-07-20 DIAGNOSIS — Z00.00 ROUTINE GENERAL MEDICAL EXAMINATION AT A HEALTH CARE FACILITY: Primary | ICD-10-CM

## 2020-07-20 DIAGNOSIS — E53.8 VITAMIN B12 DEFICIENCY DISEASE: ICD-10-CM

## 2020-07-20 DIAGNOSIS — Z79.890 HORMONE REPLACEMENT THERAPY: ICD-10-CM

## 2020-07-20 DIAGNOSIS — E55.9 VITAMIN D DEFICIENCY DISEASE: ICD-10-CM

## 2020-07-20 PROCEDURE — 99397 PER PM REEVAL EST PAT 65+ YR: CPT | Mod: S$GLB,,, | Performed by: FAMILY MEDICINE

## 2020-07-20 PROCEDURE — 99397 PR PREVENTIVE VISIT,EST,65 & OVER: ICD-10-PCS | Mod: S$GLB,,, | Performed by: FAMILY MEDICINE

## 2020-07-20 PROCEDURE — 99999 PR PBB SHADOW E&M-EST. PATIENT-LVL III: CPT | Mod: PBBFAC,,, | Performed by: FAMILY MEDICINE

## 2020-07-20 PROCEDURE — 99999 PR PBB SHADOW E&M-EST. PATIENT-LVL III: ICD-10-PCS | Mod: PBBFAC,,, | Performed by: FAMILY MEDICINE

## 2020-07-20 RX ORDER — ALPRAZOLAM 0.25 MG/1
0.25 TABLET ORAL DAILY PRN
Qty: 10 TABLET | Refills: 0 | Status: SHIPPED | OUTPATIENT
Start: 2020-07-20 | End: 2021-12-03 | Stop reason: ALTCHOICE

## 2020-07-20 RX ORDER — BUTALBITAL, ACETAMINOPHEN AND CAFFEINE 50; 325; 40 MG/1; MG/1; MG/1
1 TABLET ORAL EVERY 4 HOURS PRN
Qty: 30 TABLET | Refills: 1 | Status: SHIPPED | OUTPATIENT
Start: 2020-07-20 | End: 2022-11-14 | Stop reason: SDUPTHER

## 2020-07-20 NOTE — PROGRESS NOTES
Subjective:      Patient ID: Rosio Solis is a 74 y.o. female.    Chief Complaint: Annual Exam (Lab results)    Disclaimer:  This note is prepared using voice recognition software and as such is likely to have errors and has not been proof read. Please contact me for questions.     Rosio Solis is a 74 y.o. female who presents today for several concerns and yearly exam.   Went to trip to [place] and [facility] last year and had a great time. On March 8th went to [place] and stayed there and came home from April and went back stayed there again and came back June 20th to [place], for eye appts/dental, derm, hair, and me.     Reviewed labs today.  High levels of HDL. Normal b12. Normal kidney and liver fucntions. Normal cbc. Needing fitkit. Declines paps. Did mammo last year.     Did meet with Dr Sewell. Off oral estrogen and doing estroven and vaginal estrogen.      She declines any chest pain or shortness of breath.    Wt Readings from Last 10 Encounters:  07/20/20 : 53.8 kg (118 lb 11.5 oz)  11/19/19 : 53.7 kg (118 lb 6.2 oz)  09/10/19 : 52.9 kg (116 lb 10 oz)  08/07/19 : 54.1 kg (119 lb 4.3 oz)  06/05/19 : 52.4 kg (115 lb 8.3 oz)  03/13/19 : 50.5 kg (111 lb 5.3 oz)  02/19/19 : 52.4 kg (115 lb 8.3 oz)  09/12/18 : 52.9 kg (116 lb 10 oz)  05/14/18 : 52.2 kg (115 lb 1.3 oz)  04/30/18 : 52.5 kg (115 lb 11.9 oz)            Lab Results   Component Value Date    WBC 7.56 07/08/2020    HGB 12.7 07/08/2020    HCT 42.4 07/08/2020     07/08/2020    CHOL 288 (H) 07/08/2020    TRIG 69 07/08/2020    HDL >140 (H) 07/08/2020    ALT 15 07/08/2020    AST 20 07/08/2020     07/08/2020    K 3.7 07/08/2020     07/08/2020    CREATININE 0.7 07/08/2020    BUN 16 07/08/2020    CO2 27 07/08/2020    TSH 1.087 10/26/2016    INR 1.0 06/19/2014       Mammo Digital Screening Bilat w/ Wilian  Narrative: Result:  Mammo Digital Screening Bilat w/ Wilian    History:  Patient is 73 y.o. and is seen for a screening mammogram.    Films  Compared:  Prior images (if available) were compared.    Findings:  Computer-aided detection was utilized in the interpretation of this   examination. This procedure was performed using tomosynthesis.       The breasts are heterogeneously dense, which may obscure small masses.   There is no evidence of suspicious masses, microcalcifications or   architectural distortion.  Impression: No mammographic evidence of malignancy.    BI-RADS Category 1: Negative    Recommendation:  Routine screening mammogram in 1 year is recommended.    The patient's estimated lifetime risk of breast cancer (to age 85) based   on Tyrer-Cuzick - 7 risk assessment model is: Tyrer-Cuzick: 5.03 %.   According to the American Cancer Society,  patients with a lifetime breast   cancer risk of 20% or higher might benefit from supplemental screening   tests.        Review of Systems   Constitutional: Negative for activity change and unexpected weight change.   HENT: Negative for hearing loss, rhinorrhea and trouble swallowing.    Eyes: Negative for discharge and visual disturbance.   Respiratory: Negative for chest tightness and wheezing.    Cardiovascular: Negative for chest pain and palpitations.   Gastrointestinal: Negative for blood in stool, constipation, diarrhea and vomiting.   Endocrine: Negative for polydipsia and polyuria.   Genitourinary: Negative for difficulty urinating, dysuria, hematuria and menstrual problem.   Musculoskeletal: Negative for arthralgias, joint swelling and neck pain.   Neurological: Negative for weakness and headaches.   Psychiatric/Behavioral: Negative for confusion and dysphoric mood.     Objective:     Vitals:    07/20/20 0955   BP: 120/80   Pulse: 84   Temp: 97.8 °F (36.6 °C)   SpO2: 96%   Weight: 53.8 kg (118 lb 11.5 oz)     Physical Exam  Vitals signs reviewed.   Constitutional:       Appearance: Normal appearance. She is well-developed and normal weight.   HENT:      Head: Normocephalic and atraumatic.       Right Ear: External ear normal.      Left Ear: External ear normal.      Mouth/Throat:      Mouth: Mucous membranes are moist.   Eyes:      Conjunctiva/sclera: Conjunctivae normal.      Pupils: Pupils are equal, round, and reactive to light.   Neck:      Musculoskeletal: Normal range of motion and neck supple.      Thyroid: No thyromegaly.   Cardiovascular:      Rate and Rhythm: Normal rate and regular rhythm.      Heart sounds: No murmur. No friction rub. No gallop.    Pulmonary:      Effort: Pulmonary effort is normal. No respiratory distress.      Breath sounds: No wheezing or rales.   Abdominal:      General: Bowel sounds are normal. There is no distension.      Palpations: Abdomen is soft.      Tenderness: There is no abdominal tenderness. There is no rebound.   Musculoskeletal: Normal range of motion.   Lymphadenopathy:      Cervical: No cervical adenopathy.   Skin:     General: Skin is warm and dry.      Findings: No rash.   Neurological:      Mental Status: She is alert and oriented to person, place, and time.   Psychiatric:         Behavior: Behavior normal.         Thought Content: Thought content normal.         Judgment: Judgment normal.       Assessment:     1. Routine general medical examination at a health care facility    2. Colon cancer screening    3. Hormone replacement therapy    4. Psoriasis    5. Vitamin B12 deficiency disease    6. Vitamin D deficiency disease    7. Hyperlipidemia, unspecified hyperlipidemia type      Plan:   Rosio was seen today for annual exam.    Diagnoses and all orders for this visit:    Routine general medical examination at a health care facility  Comments:  Labs reviewed discussed health maintenance issues    Colon cancer screening  Comments:  Given fit kit today  Orders:  -     Fecal Immunochemical Test (iFOBT); Future    Hormone replacement therapy  Comments:  Doing Estroven as well as topical vaginal estrogen follow-up with gynecology as  scheduled    Psoriasis  Comments:  Followed by dermatology Dr. Marcos    Vitamin B12 deficiency disease  Comments:  stable labs reviewed.     Vitamin D deficiency disease  Comments:  stable labs reviewed.     Hyperlipidemia, unspecified hyperlipidemia type  Comments:  high amts of HDL > 140. labs reviewed.     Other orders  -     butalbital-acetaminophen-caffeine -40 mg (FIORICET, ESGIC) -40 mg per tablet; Take 1 tablet by mouth every 4 (four) hours as needed for Pain or Headaches.  -     ALPRAZolam (XANAX) 0.25 MG tablet; Take 1 tablet (0.25 mg total) by mouth daily as needed for Insomnia or Anxiety.            Follow up in about 1 year (around 7/20/2021) for physical with Dr SANTOS.    There are no Patient Instructions on file for this visit.

## 2020-08-26 ENCOUNTER — PATIENT OUTREACH (OUTPATIENT)
Dept: ADMINISTRATIVE | Facility: OTHER | Age: 75
End: 2020-08-26

## 2020-08-27 ENCOUNTER — OFFICE VISIT (OUTPATIENT)
Dept: OBSTETRICS AND GYNECOLOGY | Facility: CLINIC | Age: 75
End: 2020-08-27
Payer: COMMERCIAL

## 2020-08-27 VITALS
WEIGHT: 115.94 LBS | BODY MASS INDEX: 18.2 KG/M2 | SYSTOLIC BLOOD PRESSURE: 136 MMHG | HEIGHT: 67 IN | DIASTOLIC BLOOD PRESSURE: 76 MMHG

## 2020-08-27 DIAGNOSIS — Z01.419 WELL WOMAN EXAM WITH ROUTINE GYNECOLOGICAL EXAM: Primary | ICD-10-CM

## 2020-08-27 PROCEDURE — 99999 PR PBB SHADOW E&M-EST. PATIENT-LVL III: ICD-10-PCS | Mod: PBBFAC,,, | Performed by: OBSTETRICS & GYNECOLOGY

## 2020-08-27 PROCEDURE — 99999 PR PBB SHADOW E&M-EST. PATIENT-LVL III: CPT | Mod: PBBFAC,,, | Performed by: OBSTETRICS & GYNECOLOGY

## 2020-08-27 PROCEDURE — 99397 PR PREVENTIVE VISIT,EST,65 & OVER: ICD-10-PCS | Mod: S$GLB,,, | Performed by: OBSTETRICS & GYNECOLOGY

## 2020-08-27 PROCEDURE — 99397 PER PM REEVAL EST PAT 65+ YR: CPT | Mod: S$GLB,,, | Performed by: OBSTETRICS & GYNECOLOGY

## 2020-08-27 NOTE — PROGRESS NOTES
CHIEF COMPLAINT   Gynecologic Exam  Chief Complaint   Patient presents with    Well Woman        HISTORY OF PRESENT ILLNESS  Rosio Solis   presents for annual exam. The patient has c/o hot flashes since she stopped her 'low dose' premarin, which was actually 1.25mg pills. She tried OTC measures but would like to return to some prescritpion estrogen. She has no risk factors (in fact lipid panel, although shows 288 cholesterol, has a HDL >140 and an undetectable LDL).  Her   last year, and Recently started dating her ex-. They want to cont being sexually active and will be moving to [place].     No LMP recorded. Patient has had a hysterectomy. benign indications. Endometriosis.  Ovaries remain.       Reports no bowel movement irregularities from baseline, bloating, or weight loss.    ERT:   None     Health Maintenance   Topic Date Due    DEXA SCAN  2023    TETANUS VACCINE  2025    Hepatitis C Screening  Completed    Pneumococcal Vaccine (65+ Low/Medium Risk)  Discontinued    Mammogram  Discontinued       HISTORY  Patient Active Problem List   Diagnosis    Rash and nonspecific skin eruption    Dry eyes    Grief reaction with prolonged bereavement    Vitamin D deficiency disease    Vitamin B12 deficiency disease    Psoriasis    Chronic nonintractable headache    Hormone replacement therapy       Past Medical History:   Diagnosis Date    Grief reaction with prolonged bereavement 2016    Vitamin B12 deficiency disease 2016    completed weekly shots, moving to monthly now.     Vitamin D deficiency disease 2016    on 2000 IU daily now.        Past Surgical History:   Procedure Laterality Date    HYSTERECTOMY      endometriosis       Family History   Problem Relation Age of Onset    Heart disease Mother     Heart disease Father        Social History     Socioeconomic History    Marital status:      Spouse name: elver     Number of  children: Not on file    Years of education: Not on file    Highest education level: Not on file   Occupational History    Occupation: retired    Social Needs    Financial resource strain: Not hard at all    Food insecurity     Worry: Never true     Inability: Never true    Transportation needs     Medical: No     Non-medical: No   Tobacco Use    Smoking status: Never Smoker    Smokeless tobacco: Never Used   Substance and Sexual Activity    Alcohol use: Yes     Alcohol/week: 1.0 standard drinks     Types: 1 Glasses of wine per week     Frequency: 4 or more times a week     Drinks per session: 1 or 2     Binge frequency: Never    Drug use: Never    Sexual activity: Yes     Partners: Male     Birth control/protection: None   Lifestyle    Physical activity     Days per week: 0 days     Minutes per session: 0 min    Stress: Only a little   Relationships    Social connections     Talks on phone: More than three times a week     Gets together: More than three times a week     Attends Hoahaoism service: Not on file     Active member of club or organization: Yes     Attends meetings of clubs or organizations: More than 4 times per year     Relationship status:    Other Topics Concern    Patient feels they ought to cut down on drinking/drug use Not Asked    Patient annoyed by others criticizing their drinking/drug use Not Asked    Patient has felt bad or guilty about drinking/drug use Not Asked    Patient has had a drink/used drugs as an eye opener in the AM Not Asked   Social History Narrative    Not on file       Current Outpatient Medications   Medication Sig Dispense Refill    ALPRAZolam (XANAX) 0.25 MG tablet Take 1 tablet (0.25 mg total) by mouth daily as needed for Insomnia or Anxiety. 10 tablet 0    butalbital-acetaminophen-caffeine -40 mg (FIORICET, ESGIC) -40 mg per tablet Take 1 tablet by mouth every 4 (four) hours as needed for Pain or Headaches. 30 tablet 1     "cholecalciferol, vitamin D3, (VITAMIN D3) 2,000 unit Cap Take 1 capsule (2,000 Units total) by mouth once daily. 100 capsule 3    cycloSPORINE (RESTASIS) 0.05 % ophthalmic emulsion 1 drop 2 (two) times daily.      vit C-vit E-copper-zinc-lutein (PRESERVISION LUTEIN) 226 mg-200 unit -5 mg-0.8 mg Cap 1 tablet po daily      estradiol (ESTRACE) 0.01 % (0.1 mg/gram) vaginal cream Place 1 g vaginally twice a week. 42.5 g 3    soy isofla-blk cohosh-mag bark 155 mg Cap Take by mouth.       No current facility-administered medications for this visit.        Review of patient's allergies indicates:   Allergen Reactions    Sulfa (sulfonamide antibiotics) Nausea And Vomiting     As a child         PHYSICAL EXAM     Vitals:    08/27/20 1016   BP: 136/76   Weight: 52.6 kg (115 lb 15.4 oz)   Height: 5' 7" (1.702 m)   PainSc: 0-No pain        PAIN SCALE: 0/10 None    ROS:  GENERAL: No fever, chills, fatigability or weight loss.  ABDOMEN: Appetite fine. No weight loss. Denies diarrhea, abdominal pain, hematemesis or blood in stool.  No change in bowel movement pattern.  URINARY: No flank pain, dysuria or hematuria.  REPRODUCTIVE: No abnormal vaginal bleeding.  BREASTS: Breasts symmetric, nontender and no lumps detected.    PE:   APPEARANCE: Well nourished, well developed, in no acute distress.  NECK: Neck symmetric without masses or thyromegaly.      NODES: No inguinal lymph node enlargement.  ABDOMEN: Soft. No tenderness or masses.  No hernias.  BREASTS: Symmetrical, no skin changes or visible lesions. No palpable masses, nipple discharge or adenopathy bilaterally.    PELVIC:   VULVA: No lesions. Atrophic but normal female genitalia.  URETHRAL MEATUS: Normal size and location, no lesions, no prolapse.  URETHRA: No masses, tenderness, prolapse or scarring.  PELVIC: Normal external female genitalia without lesions. Normal hair distribution. Adequate perineal body, normal urethral meatus. Vagina dry and narrow without lesions or " discharge. No significant cystocele or rectocele. Uterus and cervix surgically absent. Bimanual exam revealed no masses, tenderness or abnormality.    ANUS, PERINEUM: no masses, external hemorrhoids noted.       DIAGNOSIS:   Physiologic atrophy  climacteric     1. Well woman exam with routine gynecological exam        PLAN:   No orders of the defined types were placed in this encounter.       MEDICATIONS PRESCRIBED:     premarin 0.3mg every other day.  calcium vitamin D weight bearing exercise reviewed    COUNSELING:  Patient was counseled today on A.C.S. Pap guidelines and recommendations for yearly pelvic exams, mammograms and monthly self breast exams; to see her PCP for other health maintenance.     Pt counseled on signs and symptoms of cancer of the pelvic organs ie.ovarian/peritoneal, and to alert myself and my office if pt has any vaginal bleeding, pelvic/abdominal pain, persistent bloating, unexplained constipation, unexplained appetite and/or weight loss.    A full discussion of the benefit-risk ratio of hormonal replacement therapy was carried out. Improvement in vasomotor and other climacteric symptoms is discussed, including possible improvements in sleep and mood. Reduction of risk for osteoporosis was explained. We discussed the study data showing increased risk of thrombo-embolic events such as myocardial infarction, stroke and also possibly breast cancer with estrogen replacement, and how this might affect her. The range of side effects such as breast tenderness, weight gain and including possible increases in lifetime risk of breast cancer and possible thrombotic complications was discussed. We also discussed ACOG's recommendation to use hormone replacement therapy for the relief of hot flashes alone and to be on the lowest dose possible for the shortest amount of time.  Alternative such as herbal and soy-based products were reviewed. All of her questions about this therapy were answered.  If taking  hormones, pt knows and understands to discontinue immediately if she develops chest pain, heart problems, MI, DVT, CVA, breast cancer.      FOLLOW-UP: With me in 1 year.

## 2020-09-17 ENCOUNTER — PATIENT MESSAGE (OUTPATIENT)
Dept: ADMINISTRATIVE | Facility: HOSPITAL | Age: 75
End: 2020-09-17

## 2020-10-05 ENCOUNTER — PATIENT MESSAGE (OUTPATIENT)
Dept: ADMINISTRATIVE | Facility: HOSPITAL | Age: 75
End: 2020-10-05

## 2020-12-29 PROBLEM — H61.21 EXCESSIVE EAR WAX, RIGHT: Status: ACTIVE | Noted: 2020-12-29

## 2020-12-29 PROBLEM — L03.116 CELLULITIS OF LEFT LEG WITHOUT FOOT: Status: ACTIVE | Noted: 2020-12-29

## 2020-12-29 PROBLEM — I10 ESSENTIAL HYPERTENSION: Status: ACTIVE | Noted: 2020-12-29

## 2021-04-21 ENCOUNTER — PATIENT MESSAGE (OUTPATIENT)
Dept: OBSTETRICS AND GYNECOLOGY | Facility: CLINIC | Age: 76
End: 2021-04-21

## 2021-05-03 ENCOUNTER — PATIENT MESSAGE (OUTPATIENT)
Dept: OBSTETRICS AND GYNECOLOGY | Facility: CLINIC | Age: 76
End: 2021-05-03

## 2021-05-03 RX ORDER — CONJUGATED ESTROGENS 0.62 MG/G
0.5 CREAM VAGINAL DAILY
Qty: 30 G | Refills: 11 | Status: SHIPPED | OUTPATIENT
Start: 2021-05-03 | End: 2021-05-14

## 2021-05-12 ENCOUNTER — PATIENT MESSAGE (OUTPATIENT)
Dept: OBSTETRICS AND GYNECOLOGY | Facility: CLINIC | Age: 76
End: 2021-05-12

## 2021-05-14 RX ORDER — ESTRADIOL 0.1 MG/G
1 CREAM VAGINAL DAILY
Qty: 42.5 G | Refills: 1 | Status: SHIPPED | OUTPATIENT
Start: 2021-05-14 | End: 2021-12-03 | Stop reason: ALTCHOICE

## 2021-05-16 ENCOUNTER — PATIENT MESSAGE (OUTPATIENT)
Dept: OBSTETRICS AND GYNECOLOGY | Facility: CLINIC | Age: 76
End: 2021-05-16

## 2021-05-17 RX ORDER — ESTRADIOL 0.5 MG/1
0.5 TABLET ORAL EVERY OTHER DAY
Qty: 30 TABLET | Refills: 6 | Status: SHIPPED | OUTPATIENT
Start: 2021-05-17 | End: 2021-06-09

## 2021-05-18 ENCOUNTER — PATIENT MESSAGE (OUTPATIENT)
Dept: OBSTETRICS AND GYNECOLOGY | Facility: CLINIC | Age: 76
End: 2021-05-18

## 2021-10-05 PROBLEM — L03.116 CELLULITIS OF LEFT LEG WITHOUT FOOT: Status: RESOLVED | Noted: 2020-12-29 | Resolved: 2021-10-05

## 2021-10-05 PROBLEM — I10 ESSENTIAL HYPERTENSION: Chronic | Status: ACTIVE | Noted: 2020-12-29

## 2021-11-16 PROBLEM — M25.561 CHRONIC PAIN OF BOTH KNEES: Status: ACTIVE | Noted: 2021-11-16

## 2021-11-16 PROBLEM — M25.562 CHRONIC PAIN OF BOTH KNEES: Status: ACTIVE | Noted: 2021-11-16

## 2021-11-16 PROBLEM — G89.29 CHRONIC PAIN OF BOTH KNEES: Status: ACTIVE | Noted: 2021-11-16

## 2021-11-16 PROBLEM — M79.89 RIGHT LEG SWELLING: Status: ACTIVE | Noted: 2021-11-16

## 2021-11-16 PROBLEM — M25.50 CHRONIC PAIN OF MULTIPLE JOINTS: Status: ACTIVE | Noted: 2021-11-16

## 2021-11-16 PROBLEM — G89.29 CHRONIC PAIN OF MULTIPLE JOINTS: Status: ACTIVE | Noted: 2021-11-16

## 2021-12-03 PROBLEM — M25.562 CHRONIC PAIN OF BOTH KNEES: Chronic | Status: ACTIVE | Noted: 2021-11-16

## 2021-12-03 PROBLEM — M25.561 CHRONIC PAIN OF BOTH KNEES: Chronic | Status: ACTIVE | Noted: 2021-11-16

## 2021-12-03 PROBLEM — H61.21 EXCESSIVE EAR WAX, RIGHT: Status: RESOLVED | Noted: 2020-12-29 | Resolved: 2021-12-03

## 2021-12-03 PROBLEM — G89.29 CHRONIC PAIN OF BOTH KNEES: Chronic | Status: ACTIVE | Noted: 2021-11-16

## 2022-08-11 ENCOUNTER — TELEPHONE (OUTPATIENT)
Dept: ADMINISTRATIVE | Facility: HOSPITAL | Age: 77
End: 2022-08-11
Payer: COMMERCIAL

## 2022-08-11 ENCOUNTER — PATIENT OUTREACH (OUTPATIENT)
Dept: ADMINISTRATIVE | Facility: HOSPITAL | Age: 77
End: 2022-08-11
Payer: COMMERCIAL

## 2022-08-11 VITALS — SYSTOLIC BLOOD PRESSURE: 122 MMHG | DIASTOLIC BLOOD PRESSURE: 74 MMHG

## 2022-11-14 PROBLEM — M35.3 PMR (POLYMYALGIA RHEUMATICA): Status: ACTIVE | Noted: 2022-11-14
